# Patient Record
Sex: FEMALE | Race: BLACK OR AFRICAN AMERICAN | Employment: OTHER | URBAN - METROPOLITAN AREA
[De-identification: names, ages, dates, MRNs, and addresses within clinical notes are randomized per-mention and may not be internally consistent; named-entity substitution may affect disease eponyms.]

---

## 2017-03-01 ENCOUNTER — OFFICE VISIT (OUTPATIENT)
Dept: PRIMARY CARE CLINIC | Age: 72
End: 2017-03-01

## 2017-03-01 VITALS
OXYGEN SATURATION: 99 % | TEMPERATURE: 97.8 F | WEIGHT: 167 LBS | DIASTOLIC BLOOD PRESSURE: 100 MMHG | HEIGHT: 60 IN | HEART RATE: 91 BPM | BODY MASS INDEX: 32.79 KG/M2 | SYSTOLIC BLOOD PRESSURE: 150 MMHG

## 2017-03-01 DIAGNOSIS — M81.0 OSTEOPOROSIS: ICD-10-CM

## 2017-03-01 DIAGNOSIS — E55.9 VITAMIN D DEFICIENCY: ICD-10-CM

## 2017-03-01 DIAGNOSIS — R73.9 HYPERGLYCEMIA: ICD-10-CM

## 2017-03-01 DIAGNOSIS — Z12.31 ENCOUNTER FOR SCREENING MAMMOGRAM FOR HIGH-RISK PATIENT: ICD-10-CM

## 2017-03-01 DIAGNOSIS — Z12.11 SCREEN FOR COLON CANCER: ICD-10-CM

## 2017-03-01 DIAGNOSIS — Z23 NEED FOR PNEUMOCOCCAL VACCINATION: ICD-10-CM

## 2017-03-01 DIAGNOSIS — H53.9 VISION ABNORMALITIES: ICD-10-CM

## 2017-03-01 LAB
A/G RATIO: 1.4 (ref 1.1–2.2)
ALBUMIN SERPL-MCNC: 4.2 G/DL (ref 3.4–5)
ALP BLD-CCNC: 129 U/L (ref 40–129)
ALT SERPL-CCNC: 16 U/L (ref 10–40)
ANION GAP SERPL CALCULATED.3IONS-SCNC: 17 MMOL/L (ref 3–16)
AST SERPL-CCNC: 19 U/L (ref 15–37)
BACTERIA: ABNORMAL /HPF
BASOPHILS ABSOLUTE: 0 K/UL (ref 0–0.2)
BASOPHILS RELATIVE PERCENT: 0.5 %
BILIRUB SERPL-MCNC: 0.3 MG/DL (ref 0–1)
BILIRUBIN URINE: NEGATIVE
BLOOD, URINE: ABNORMAL
BUN BLDV-MCNC: 13 MG/DL (ref 7–20)
CALCIUM SERPL-MCNC: 9.6 MG/DL (ref 8.3–10.6)
CHLORIDE BLD-SCNC: 97 MMOL/L (ref 99–110)
CHOLESTEROL, TOTAL: 166 MG/DL (ref 0–199)
CLARITY: CLEAR
CO2: 26 MMOL/L (ref 21–32)
COLOR: YELLOW
COMMENT UA: ABNORMAL
CREAT SERPL-MCNC: 0.8 MG/DL (ref 0.6–1.2)
EOSINOPHILS ABSOLUTE: 0.1 K/UL (ref 0–0.6)
EOSINOPHILS RELATIVE PERCENT: 1.2 %
EPITHELIAL CELLS, UA: 2 /HPF (ref 0–5)
GFR AFRICAN AMERICAN: >60
GFR NON-AFRICAN AMERICAN: >60
GLOBULIN: 3 G/DL
GLUCOSE BLD-MCNC: 85 MG/DL (ref 70–99)
GLUCOSE URINE: NEGATIVE MG/DL
HCT VFR BLD CALC: 39.1 % (ref 36–48)
HDLC SERPL-MCNC: 45 MG/DL (ref 40–60)
HEMOGLOBIN: 12.6 G/DL (ref 12–16)
HYALINE CASTS: 0 /HPF (ref 0–8)
KETONES, URINE: NEGATIVE MG/DL
LDL CHOLESTEROL CALCULATED: 102 MG/DL
LEUKOCYTE ESTERASE, URINE: ABNORMAL
LYMPHOCYTES ABSOLUTE: 3.1 K/UL (ref 1–5.1)
LYMPHOCYTES RELATIVE PERCENT: 35.5 %
MCH RBC QN AUTO: 27.6 PG (ref 26–34)
MCHC RBC AUTO-ENTMCNC: 32.1 G/DL (ref 31–36)
MCV RBC AUTO: 85.7 FL (ref 80–100)
MICROSCOPIC EXAMINATION: YES
MONOCYTES ABSOLUTE: 0.5 K/UL (ref 0–1.3)
MONOCYTES RELATIVE PERCENT: 5.5 %
NEUTROPHILS ABSOLUTE: 5 K/UL (ref 1.7–7.7)
NEUTROPHILS RELATIVE PERCENT: 57.3 %
NITRITE, URINE: NEGATIVE
PDW BLD-RTO: 13.3 % (ref 12.4–15.4)
PH UA: 7
PLATELET # BLD: 202 K/UL (ref 135–450)
PMV BLD AUTO: 9.3 FL (ref 5–10.5)
POTASSIUM SERPL-SCNC: 4.1 MMOL/L (ref 3.5–5.1)
PROTEIN UA: NEGATIVE MG/DL
RBC # BLD: 4.56 M/UL (ref 4–5.2)
RBC UA: ABNORMAL /HPF (ref 0–2)
SODIUM BLD-SCNC: 140 MMOL/L (ref 136–145)
SPECIFIC GRAVITY UA: 1.02
TOTAL PROTEIN: 7.2 G/DL (ref 6.4–8.2)
TRIGL SERPL-MCNC: 97 MG/DL (ref 0–150)
TSH SERPL DL<=0.05 MIU/L-ACNC: 1.7 UIU/ML (ref 0.27–4.2)
URINE TYPE: ABNORMAL
UROBILINOGEN, URINE: 0.2 E.U./DL
VITAMIN D 25-HYDROXY: 20.1 NG/ML
VLDLC SERPL CALC-MCNC: 19 MG/DL
WBC # BLD: 8.7 K/UL (ref 4–11)
WBC UA: 9 /HPF (ref 0–5)

## 2017-03-01 PROCEDURE — 90732 PPSV23 VACC 2 YRS+ SUBQ/IM: CPT | Performed by: INTERNAL MEDICINE

## 2017-03-01 PROCEDURE — 99204 OFFICE O/P NEW MOD 45 MIN: CPT | Performed by: INTERNAL MEDICINE

## 2017-03-01 PROCEDURE — G0009 ADMIN PNEUMOCOCCAL VACCINE: HCPCS | Performed by: INTERNAL MEDICINE

## 2017-03-01 ASSESSMENT — ENCOUNTER SYMPTOMS
WHEEZING: 0
CONSTIPATION: 0
COUGH: 0
GASTROINTESTINAL NEGATIVE: 1
DIARRHEA: 0
ABDOMINAL PAIN: 0
BLOOD IN STOOL: 0
CHEST TIGHTNESS: 0
ABDOMINAL DISTENTION: 0
SHORTNESS OF BREATH: 0

## 2017-03-01 ASSESSMENT — PATIENT HEALTH QUESTIONNAIRE - PHQ9
SUM OF ALL RESPONSES TO PHQ9 QUESTIONS 1 & 2: 0
2. FEELING DOWN, DEPRESSED OR HOPELESS: 0
SUM OF ALL RESPONSES TO PHQ QUESTIONS 1-9: 0
1. LITTLE INTEREST OR PLEASURE IN DOING THINGS: 0

## 2017-03-02 LAB
ESTIMATED AVERAGE GLUCOSE: 116.9 MG/DL
HBA1C MFR BLD: 5.7 %

## 2017-03-02 RX ORDER — ERGOCALCIFEROL (VITAMIN D2) 1250 MCG
50000 CAPSULE ORAL WEEKLY
Qty: 4 CAPSULE | Refills: 3 | Status: SHIPPED | OUTPATIENT
Start: 2017-03-02 | End: 2018-04-25 | Stop reason: ALTCHOICE

## 2017-03-06 ENCOUNTER — TELEPHONE (OUTPATIENT)
Dept: PRIMARY CARE CLINIC | Age: 72
End: 2017-03-06

## 2018-04-27 ENCOUNTER — TELEPHONE (OUTPATIENT)
Dept: PRIMARY CARE CLINIC | Age: 73
End: 2018-04-27

## 2020-01-16 ENCOUNTER — HOSPITAL ENCOUNTER (EMERGENCY)
Age: 75
Discharge: HOME | End: 2020-01-16
Payer: MEDICARE

## 2020-01-16 VITALS
HEART RATE: 87 BPM | SYSTOLIC BLOOD PRESSURE: 156 MMHG | RESPIRATION RATE: 16 BRPM | TEMPERATURE: 98 F | BODY MASS INDEX: 30.02 KG/M2 | WEIGHT: 163.14 LBS | OXYGEN SATURATION: 91 % | HEIGHT: 62 IN | DIASTOLIC BLOOD PRESSURE: 81 MMHG

## 2020-01-16 PROCEDURE — 99282 EMERGENCY DEPT VISIT SF MDM: CPT

## 2020-01-16 ASSESSMENT — PAIN DESCRIPTION - FREQUENCY: FREQUENCY: CONTINUOUS

## 2020-01-16 ASSESSMENT — PAIN DESCRIPTION - ONSET: ONSET: GRADUAL

## 2020-01-16 ASSESSMENT — PAIN DESCRIPTION - PROGRESSION: CLINICAL_PROGRESSION: NOT CHANGED

## 2020-01-16 ASSESSMENT — PAIN DESCRIPTION - DESCRIPTORS: DESCRIPTORS: ACHING

## 2020-01-16 ASSESSMENT — PAIN DESCRIPTION - PAIN TYPE: TYPE: ACUTE PAIN

## 2020-01-16 ASSESSMENT — PAIN DESCRIPTION - ORIENTATION: ORIENTATION: LEFT

## 2020-01-16 ASSESSMENT — PAIN DESCRIPTION - LOCATION: LOCATION: ARM;BACK;LEG

## 2020-01-16 ASSESSMENT — PAIN SCALES - GENERAL: PAINLEVEL_OUTOF10: 5

## 2020-01-16 ASSESSMENT — PAIN - FUNCTIONAL ASSESSMENT: PAIN_FUNCTIONAL_ASSESSMENT: ACTIVITIES ARE NOT PREVENTED

## 2020-01-16 NOTE — ED NOTES
AVS reviewed with pt who verbalized understanding of f/u care and d/c instructions.  Pt stable for d.c      Leonela Rubin RN  01/16/20 5936
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
## 2020-01-18 ASSESSMENT — ENCOUNTER SYMPTOMS
BACK PAIN: 1
RESPIRATORY NEGATIVE: 1

## 2020-03-11 ENCOUNTER — APPOINTMENT (OUTPATIENT)
Dept: GENERAL RADIOLOGY | Age: 75
End: 2020-03-11
Payer: MEDICARE

## 2020-03-11 ENCOUNTER — APPOINTMENT (OUTPATIENT)
Dept: CT IMAGING | Age: 75
End: 2020-03-11
Payer: MEDICARE

## 2020-03-11 ENCOUNTER — HOSPITAL ENCOUNTER (EMERGENCY)
Age: 75
Discharge: HOME | End: 2020-03-11
Attending: EMERGENCY MEDICINE
Payer: MEDICARE

## 2020-03-11 VITALS
OXYGEN SATURATION: 99 % | HEART RATE: 63 BPM | BODY MASS INDEX: 30.55 KG/M2 | SYSTOLIC BLOOD PRESSURE: 145 MMHG | TEMPERATURE: 98.5 F | HEIGHT: 61 IN | RESPIRATION RATE: 14 BRPM | WEIGHT: 161.82 LBS | DIASTOLIC BLOOD PRESSURE: 92 MMHG

## 2020-03-11 LAB
A/G RATIO: 1 (ref 1.1–2.2)
ALBUMIN SERPL-MCNC: 3.7 G/DL (ref 3.4–5)
ALP BLD-CCNC: 145 U/L (ref 40–129)
ALT SERPL-CCNC: 273 U/L (ref 10–40)
ANION GAP SERPL CALCULATED.3IONS-SCNC: 11 MMOL/L (ref 3–16)
AST SERPL-CCNC: 117 U/L (ref 15–37)
BACTERIA: ABNORMAL /HPF
BASOPHILS ABSOLUTE: 0.1 K/UL (ref 0–0.2)
BASOPHILS RELATIVE PERCENT: 1 %
BILIRUB SERPL-MCNC: 0.7 MG/DL (ref 0–1)
BILIRUBIN URINE: NEGATIVE
BLOOD, URINE: NEGATIVE
BUN BLDV-MCNC: 11 MG/DL (ref 7–20)
CALCIUM SERPL-MCNC: 9.4 MG/DL (ref 8.3–10.6)
CHLORIDE BLD-SCNC: 100 MMOL/L (ref 99–110)
CLARITY: CLEAR
CO2: 26 MMOL/L (ref 21–32)
COLOR: YELLOW
CREAT SERPL-MCNC: 0.8 MG/DL (ref 0.6–1.2)
EKG ATRIAL RATE: 76 BPM
EKG DIAGNOSIS: NORMAL
EKG P AXIS: 40 DEGREES
EKG P-R INTERVAL: 154 MS
EKG Q-T INTERVAL: 384 MS
EKG QRS DURATION: 126 MS
EKG QTC CALCULATION (BAZETT): 432 MS
EKG R AXIS: 0 DEGREES
EKG T AXIS: 11 DEGREES
EKG VENTRICULAR RATE: 76 BPM
EOSINOPHILS ABSOLUTE: 0.1 K/UL (ref 0–0.6)
EOSINOPHILS RELATIVE PERCENT: 1.3 %
EPITHELIAL CELLS, UA: 4 /HPF (ref 0–5)
GFR AFRICAN AMERICAN: >60
GFR NON-AFRICAN AMERICAN: >60
GLOBULIN: 3.8 G/DL
GLUCOSE BLD-MCNC: 111 MG/DL (ref 70–99)
GLUCOSE URINE: NEGATIVE MG/DL
HAV IGM SER IA-ACNC: NORMAL
HCT VFR BLD CALC: 39.2 % (ref 36–48)
HEMOGLOBIN: 12.7 G/DL (ref 12–16)
HEPATITIS B CORE IGM ANTIBODY: NORMAL
HEPATITIS B SURFACE ANTIGEN INTERPRETATION: NORMAL
HEPATITIS C ANTIBODY INTERPRETATION: NORMAL
HYALINE CASTS: 2 /LPF (ref 0–8)
KETONES, URINE: NEGATIVE MG/DL
LEUKOCYTE ESTERASE, URINE: ABNORMAL
LIPASE: 25 U/L (ref 13–60)
LYMPHOCYTES ABSOLUTE: 2 K/UL (ref 1–5.1)
LYMPHOCYTES RELATIVE PERCENT: 28.1 %
MCH RBC QN AUTO: 27.9 PG (ref 26–34)
MCHC RBC AUTO-ENTMCNC: 32.3 G/DL (ref 31–36)
MCV RBC AUTO: 86.4 FL (ref 80–100)
MICROSCOPIC EXAMINATION: YES
MONOCYTES ABSOLUTE: 0.4 K/UL (ref 0–1.3)
MONOCYTES RELATIVE PERCENT: 6 %
NEUTROPHILS ABSOLUTE: 4.5 K/UL (ref 1.7–7.7)
NEUTROPHILS RELATIVE PERCENT: 63.6 %
NITRITE, URINE: NEGATIVE
PDW BLD-RTO: 13.5 % (ref 12.4–15.4)
PH UA: 6.5 (ref 5–8)
PLATELET # BLD: 188 K/UL (ref 135–450)
PMV BLD AUTO: 7.9 FL (ref 5–10.5)
POTASSIUM REFLEX MAGNESIUM: 4.2 MMOL/L (ref 3.5–5.1)
PROTEIN UA: NEGATIVE MG/DL
RBC # BLD: 4.53 M/UL (ref 4–5.2)
RBC UA: 2 /HPF (ref 0–4)
SODIUM BLD-SCNC: 137 MMOL/L (ref 136–145)
SPECIFIC GRAVITY UA: 1.02 (ref 1–1.03)
TOTAL PROTEIN: 7.5 G/DL (ref 6.4–8.2)
URINE REFLEX TO CULTURE: YES
URINE TYPE: ABNORMAL
UROBILINOGEN, URINE: 1 E.U./DL
WBC # BLD: 7.1 K/UL (ref 4–11)
WBC UA: 10 /HPF (ref 0–5)

## 2020-03-11 PROCEDURE — 6360000004 HC RX CONTRAST MEDICATION: Performed by: PHYSICIAN ASSISTANT

## 2020-03-11 PROCEDURE — 2580000003 HC RX 258: Performed by: PHYSICIAN ASSISTANT

## 2020-03-11 PROCEDURE — 93010 ELECTROCARDIOGRAM REPORT: CPT | Performed by: INTERNAL MEDICINE

## 2020-03-11 PROCEDURE — 83690 ASSAY OF LIPASE: CPT

## 2020-03-11 PROCEDURE — 71045 X-RAY EXAM CHEST 1 VIEW: CPT

## 2020-03-11 PROCEDURE — 80053 COMPREHEN METABOLIC PANEL: CPT

## 2020-03-11 PROCEDURE — 85025 COMPLETE CBC W/AUTO DIFF WBC: CPT

## 2020-03-11 PROCEDURE — 93005 ELECTROCARDIOGRAM TRACING: CPT | Performed by: PHYSICIAN ASSISTANT

## 2020-03-11 PROCEDURE — 81001 URINALYSIS AUTO W/SCOPE: CPT

## 2020-03-11 PROCEDURE — 99284 EMERGENCY DEPT VISIT MOD MDM: CPT

## 2020-03-11 PROCEDURE — 87086 URINE CULTURE/COLONY COUNT: CPT

## 2020-03-11 PROCEDURE — 74177 CT ABD & PELVIS W/CONTRAST: CPT

## 2020-03-11 PROCEDURE — 80074 ACUTE HEPATITIS PANEL: CPT

## 2020-03-11 RX ORDER — 0.9 % SODIUM CHLORIDE 0.9 %
1000 INTRAVENOUS SOLUTION INTRAVENOUS ONCE
Status: COMPLETED | OUTPATIENT
Start: 2020-03-11 | End: 2020-03-11

## 2020-03-11 RX ADMIN — IOPAMIDOL 75 ML: 755 INJECTION, SOLUTION INTRAVENOUS at 09:32

## 2020-03-11 RX ADMIN — SODIUM CHLORIDE 1000 ML: 9 INJECTION, SOLUTION INTRAVENOUS at 09:07

## 2020-03-11 NOTE — ED PROVIDER NOTES
[facility]  eMERGENCY dEPARTMENT eNCOUnter   Physician Attestation    Pt Name: Rivka Li  MRN: [id number]  [place] 1945  Date of evaluation: 3/11/20        Physician Note:    I havepersonally performed and/or participated in the history, exam and medical decision making and agree with all pertinent clinical information. I have also reviewed and agree with the past medical, family and social historyunless otherwise noted. I have personally performed a face to face diagnostic evaluation onthis patient. I have reviewed the mid-levels findings and agree. History: 82-year-old female who presents with some dizziness. She was outside a night and ate some chili. That night she started having vomiting than diarrhea. For the most part the symptoms are is resolved. She was still feeling a little dizzy so she comes in to be evaluated. At the moment she is asymptomatic and she is able to eat and drink. CT scan was done that showed a possible gallbladder carcinoma with some lymph node involvement. Physical Exam  Vitals signs and nursing note reviewed. Constitutional:       Appearance: She is well-developed. She is not diaphoretic. HENT:      Head: Normocephalic and atraumatic. Right Ear: External ear normal.      Left Ear: External ear normal.   Eyes:      General: No scleral icterus. Right eye: No discharge. Left eye: No discharge. Conjunctiva/sclera: Conjunctivae normal.   Neck:      Musculoskeletal: Normal range of motion. Trachea: No tracheal deviation. Pulmonary:      Effort: Pulmonary effort is normal. No respiratory distress. Breath sounds: No stridor. Abdominal:      Tenderness: There is no abdominal tenderness. There is no guarding or rebound. Musculoskeletal: Normal range of motion. Skin:     General: Skin is warm and dry. Neurological:      Mental Status: She is alert and oriented to person, place, and time. Coordination: Coordination normal.   Psychiatric:         Behavior: Behavior normal.       Ct Abdomen Pelvis W Iv Contrast Additional Contrast? None    Result Date: 3/11/2020  EXAMINATION: CT OF THE ABDOMEN AND PELVIS WITH CONTRAST 3/11/2020 9:32 am TECHNIQUE: CT of the abdomen and pelvis was performed with the administration of intravenous contrast. Multiplanar reformatted images are provided for review. Dose modulation, iterative reconstruction, and/or weight based adjustment of the mA/kV was utilized to reduce the radiation dose to as low as reasonably achievable. COMPARISON: None. HISTORY: ORDERING SYSTEM PROVIDED HISTORY: N/V/D, elevated LFTs TECHNOLOGIST PROVIDED HISTORY: Reason for exam:->N/V/D, elevated LFTs Additional Contrast?->None Reason for Exam: vomitting, elevated lft's Acuity: Acute Type of Exam: Initial Relevant Medical/Surgical History: hysterectomy FINDINGS: Lower Chest: There is a large sliding-type hiatal hernia. There is no consolidation or effusion. Organs: The liver, pancreas, spleen, kidneys and adrenals are unremarkable aside from a tiny nonobstructing 1 mm left intrarenal calculus and benign 17 mm right adrenal adenoma. There is lobular enhancing soft tissue mass within the gallbladder exhibiting dimensions of 3.5 x 2.4 cm. There are a few small calcifications within the lower dependent portion of the gallbladder. GI/Bowel: Moderate diverticular changes are present throughout the left hemicolon. There is no bowel dilatation, wall thickening or obstruction. Pelvis: The uterus is surgically absent. The bladder is unremarkable. Peritoneum/Retroperitoneum: There is no free air, free fluid or intraperitoneal inflammatory change. There are a few mildly enlarged lymph nodes within the saul hepatis ranging in size up to 1.2 cm in diameter. Bones/Soft Tissues: There is no fracture or aggressive osseous lesion.      1. 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 2. Cholelithiasis. 3. Diverticulosis. 4. Nonobstructing left nephrolithiasis. Xr Chest Portable    Result Date: 3/11/2020  EXAMINATION: ONE XRAY VIEW OF THE CHEST 3/11/2020 9:12 am COMPARISON: None. HISTORY: ORDERING SYSTEM PROVIDED HISTORY: N/V/D TECHNOLOGIST PROVIDED HISTORY: Reason for exam:->N/V/D Reason for Exam: N/V/D Acuity: Acute Type of Exam: Initial FINDINGS: Heart size is normal.  Hiatal hernia is seen. No focal lung consolidation noted. No pulmonary edema. No acute disease       1. Gastroenteritis    2. Abnormal computed tomography angiography (CTA) of abdomen    3. Elevated blood pressure reading          DISPOSITION/PLAN  PATIENT REFERRED TO:  Elbert Navarrete MD  [address] [place] [address]  [phone]    On 3/17/2020  AT [facility]  [phone]  Call   to get a family doctor    Elbert Navarrete MD  [address]  [phone]    On 3/17/2020  appointment at 8 AM.     DISCHARGE MEDICATIONS:  There are no discharge medications for this patient.         MD Rudy Estes MD  03/11/20 9456
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
## 2020-03-12 LAB — URINE CULTURE, ROUTINE: NORMAL

## 2020-03-20 ENCOUNTER — OFFICE VISIT (OUTPATIENT)
Dept: SURGERY | Age: 75
End: 2020-03-20
Payer: MEDICARE

## 2020-03-20 VITALS
SYSTOLIC BLOOD PRESSURE: 146 MMHG | HEIGHT: 62 IN | BODY MASS INDEX: 29.6 KG/M2 | HEART RATE: 87 BPM | OXYGEN SATURATION: 96 % | DIASTOLIC BLOOD PRESSURE: 93 MMHG | TEMPERATURE: 98.1 F

## 2020-03-20 PROCEDURE — 99205 OFFICE O/P NEW HI 60 MIN: CPT | Performed by: SURGERY

## 2020-03-25 ENCOUNTER — TELEPHONE (OUTPATIENT)
Dept: SURGERY | Age: 75
End: 2020-03-25

## 2020-04-08 ENCOUNTER — TELEPHONE (OUTPATIENT)
Dept: SURGERY | Age: 75
End: 2020-04-08

## 2020-04-23 ENCOUNTER — TELEPHONE (OUTPATIENT)
Dept: SURGERY | Age: 75
End: 2020-04-23

## 2020-04-23 NOTE — PROGRESS NOTES
discoloration noted on facial skin         [] Abnormal-            Psychiatric:       [x] Normal mood and Affect. Normal behavior, judgement and thought content. [] Abnormal-     Other pertinent observable physical exam findings- none      ASSESSMENT/PLAN:     Diagnosis Orders   1. Gallbladder mass         Adriana Oates is a 69 y.o. female being evaluated by a Virtual Visit (video visit) encounter to address concerns as mentioned above. A caregiver was present when appropriate. Due to this being a TeleHealth encounter (During [facility]-17 public health emergency), evaluation of the some organ systems was limited. This Virtual Visit was conducted with patient's (and/or legal guardian's) consent. The patient (and/or legal guardian) has also been advised to contact this office for worsening conditions or problems, and seek emergency medical treatment and/or call 911 if deemed necessary. I discussed with the [address] [place] [address],[address] about the diagnosis and management options. At patient's last visit I explained that based on the available information she appears to have a Gallbladder mass most consistent with gallbladder carcinoma with possible localized lymph node involvement. Today I reviewed with  her about laparoscopic / robotic and open Cholecystectomy with liver resection and lymph nodes removal. Printed information was given at last visit. All the complications including bleeding, infection, clot's, leak, pneumonia, heart attack and stroke were explained. Risks, benefits and alternatives of surgery explained to the patient. Further management will depend on final pathology. All the questions of the patient are answered to her apparent satisfaction. Patient verbalized understanding of the management plan. Patient understands the risk of COVID-19 infection. Flex Negrete MD  Surgery Attending      An electronic signature was used to authenticate this note.

## 2020-04-24 ENCOUNTER — TELEPHONE (OUTPATIENT)
Dept: SURGERY | Age: 75
End: 2020-04-24

## 2020-04-24 ENCOUNTER — TELEMEDICINE (OUTPATIENT)
Dept: SURGERY | Age: 75
End: 2020-04-24
Payer: MEDICARE

## 2020-04-24 PROCEDURE — 99214 OFFICE O/P EST MOD 30 MIN: CPT | Performed by: SURGERY

## 2020-04-29 ENCOUNTER — OFFICE VISIT (OUTPATIENT)
Dept: PRIMARY CARE CLINIC | Age: 75
End: 2020-04-29
Payer: MEDICARE

## 2020-04-29 ENCOUNTER — TELEPHONE (OUTPATIENT)
Dept: SURGERY | Age: 75
End: 2020-04-29

## 2020-04-29 ENCOUNTER — OFFICE VISIT (OUTPATIENT)
Dept: CARDIOLOGY CLINIC | Age: 75
End: 2020-04-29
Payer: MEDICARE

## 2020-04-29 VITALS — WEIGHT: 16 LBS | BODY MASS INDEX: 2.93 KG/M2 | TEMPERATURE: 98.2 F | HEART RATE: 80 BPM

## 2020-04-29 VITALS — TEMPERATURE: 99 F | HEART RATE: 76 BPM | OXYGEN SATURATION: 97 %

## 2020-04-29 PROCEDURE — 99213 OFFICE O/P EST LOW 20 MIN: CPT | Performed by: NURSE PRACTITIONER

## 2020-04-29 PROCEDURE — 99204 OFFICE O/P NEW MOD 45 MIN: CPT | Performed by: INTERNAL MEDICINE

## 2020-04-29 PROCEDURE — 93000 ELECTROCARDIOGRAM COMPLETE: CPT | Performed by: INTERNAL MEDICINE

## 2020-04-29 ASSESSMENT — ENCOUNTER SYMPTOMS
SHORTNESS OF BREATH: 0
CHOKING: 0
COUGH: 0
CHEST TIGHTNESS: 0
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
## 2020-04-30 ENCOUNTER — TELEPHONE (OUTPATIENT)
Dept: CARDIOLOGY CLINIC | Age: 75
End: 2020-04-30

## 2020-04-30 ENCOUNTER — HOSPITAL ENCOUNTER (OUTPATIENT)
Dept: NON INVASIVE DIAGNOSTICS | Age: 75
Discharge: HOME | End: 2020-04-30
Payer: MEDICARE

## 2020-04-30 ENCOUNTER — APPOINTMENT (OUTPATIENT)
Dept: NON INVASIVE DIAGNOSTICS | Age: 75
End: 2020-04-30
Payer: MEDICARE

## 2020-04-30 ENCOUNTER — HOSPITAL ENCOUNTER (OUTPATIENT)
Dept: NON INVASIVE DIAGNOSTICS | Age: 75
End: 2020-04-30
Payer: MEDICARE

## 2020-04-30 LAB
LV EF: 95 %
LVEF MODALITY: NORMAL

## 2020-04-30 PROCEDURE — 78452 HT MUSCLE IMAGE SPECT MULT: CPT

## 2020-04-30 PROCEDURE — A9502 TC99M TETROFOSMIN: HCPCS | Performed by: INTERNAL MEDICINE

## 2020-04-30 PROCEDURE — 93017 CV STRESS TEST TRACING ONLY: CPT

## 2020-04-30 PROCEDURE — 6360000002 HC RX W HCPCS: Performed by: INTERNAL MEDICINE

## 2020-04-30 PROCEDURE — 2580000003 HC RX 258: Performed by: INTERNAL MEDICINE

## 2020-04-30 PROCEDURE — 3430000000 HC RX DIAGNOSTIC RADIOPHARMACEUTICAL: Performed by: INTERNAL MEDICINE

## 2020-04-30 RX ORDER — SODIUM CHLORIDE 0.9 % (FLUSH) 0.9 %
10 SYRINGE (ML) INJECTION PRN
Status: COMPLETED | OUTPATIENT
Start: 2020-04-30 | End: 2020-04-30

## 2020-04-30 RX ADMIN — Medication 10 ML: at 14:18

## 2020-04-30 RX ADMIN — REGADENOSON 0.4 MG: 0.08 INJECTION, SOLUTION INTRAVENOUS at 14:19

## 2020-04-30 RX ADMIN — Medication 10 ML: at 13:37

## 2020-04-30 RX ADMIN — TETROFOSMIN 9.5 MILLICURIE: 1.38 INJECTION, POWDER, LYOPHILIZED, FOR SOLUTION INTRAVENOUS at 13:37

## 2020-04-30 RX ADMIN — TETROFOSMIN 36 MILLICURIE: 1.38 INJECTION, POWDER, LYOPHILIZED, FOR SOLUTION INTRAVENOUS at 14:18

## 2020-04-30 NOTE — TELEPHONE ENCOUNTER
Pt having surgery Monday she would like to know if  can put something in the computer to over ride her so that she can have her stress test. A stat order was placed on 04/29/2020 to be completed 04/30/2020 however she had to be tested for COVID-19 for an upcoming surgery which prevented her from having her stress test  Today. She is afraid that she will not be able to have surgery Monday. She wants to know what can she do.

## 2020-05-01 ENCOUNTER — OFFICE VISIT (OUTPATIENT)
Dept: PRIMARY CARE CLINIC | Age: 75
End: 2020-05-01

## 2020-05-01 ENCOUNTER — ANESTHESIA EVENT (OUTPATIENT)
Dept: OPERATING ROOM | Age: 75
End: 2020-05-01
Payer: MEDICARE

## 2020-05-01 PROCEDURE — 99999 PR OFFICE/OUTPT VISIT,PROCEDURE ONLY: CPT | Performed by: NURSE PRACTITIONER

## 2020-05-02 LAB
SARS-COV-2: NOT DETECTED
SOURCE: NORMAL

## 2020-05-04 ENCOUNTER — APPOINTMENT (OUTPATIENT)
Dept: GENERAL RADIOLOGY | Age: 75
End: 2020-05-04
Attending: SURGERY
Payer: MEDICARE

## 2020-05-04 ENCOUNTER — ANESTHESIA (OUTPATIENT)
Dept: OPERATING ROOM | Age: 75
End: 2020-05-04
Payer: MEDICARE

## 2020-05-04 ENCOUNTER — HOSPITAL ENCOUNTER (OUTPATIENT)
Age: 75
Setting detail: OUTPATIENT SURGERY
Discharge: HOME | End: 2020-05-04
Attending: SURGERY | Admitting: SURGERY
Payer: MEDICARE

## 2020-05-04 VITALS
BODY MASS INDEX: 30.55 KG/M2 | HEART RATE: 85 BPM | HEIGHT: 62 IN | WEIGHT: 166 LBS | TEMPERATURE: 97.4 F | SYSTOLIC BLOOD PRESSURE: 133 MMHG | DIASTOLIC BLOOD PRESSURE: 86 MMHG | OXYGEN SATURATION: 97 % | RESPIRATION RATE: 16 BRPM

## 2020-05-04 VITALS — TEMPERATURE: 97.3 F | SYSTOLIC BLOOD PRESSURE: 142 MMHG | DIASTOLIC BLOOD PRESSURE: 70 MMHG | OXYGEN SATURATION: 94 %

## 2020-05-04 LAB
A/G RATIO: 1.3 (ref 1.1–2.2)
ABO/RH: NORMAL
ALBUMIN SERPL-MCNC: 4.3 G/DL (ref 3.4–5)
ALP BLD-CCNC: 110 U/L (ref 40–129)
ALT SERPL-CCNC: 20 U/L (ref 10–40)
ANION GAP SERPL CALCULATED.3IONS-SCNC: 12 MMOL/L (ref 3–16)
ANTIBODY SCREEN: NORMAL
APTT: 28.1 SEC (ref 24.2–36.2)
AST SERPL-CCNC: 33 U/L (ref 15–37)
BILIRUB SERPL-MCNC: 0.6 MG/DL (ref 0–1)
BUN BLDV-MCNC: 11 MG/DL (ref 7–20)
CALCIUM SERPL-MCNC: 9.6 MG/DL (ref 8.3–10.6)
CHLORIDE BLD-SCNC: 101 MMOL/L (ref 99–110)
CO2: 25 MMOL/L (ref 21–32)
CREAT SERPL-MCNC: 0.8 MG/DL (ref 0.6–1.2)
GFR AFRICAN AMERICAN: >60
GFR NON-AFRICAN AMERICAN: >60
GLOBULIN: 3.3 G/DL
GLUCOSE BLD-MCNC: 105 MG/DL (ref 70–99)
HCT VFR BLD CALC: 40.1 % (ref 36–48)
HEMOGLOBIN: 12.8 G/DL (ref 12–16)
INR BLD: 1.09 (ref 0.86–1.14)
MCH RBC QN AUTO: 28 PG (ref 26–34)
MCHC RBC AUTO-ENTMCNC: 32 G/DL (ref 31–36)
MCV RBC AUTO: 87.6 FL (ref 80–100)
PDW BLD-RTO: 13.2 % (ref 12.4–15.4)
PLATELET # BLD: 202 K/UL (ref 135–450)
PMV BLD AUTO: 8.3 FL (ref 5–10.5)
POTASSIUM SERPL-SCNC: 4.1 MMOL/L (ref 3.5–5.1)
PROTHROMBIN TIME: 12.6 SEC (ref 10–13.2)
RBC # BLD: 4.57 M/UL (ref 4–5.2)
SARS-COV-2: NOT DETECTED
SODIUM BLD-SCNC: 138 MMOL/L (ref 136–145)
SOURCE: NORMAL
TOTAL PROTEIN: 7.6 G/DL (ref 6.4–8.2)
WBC # BLD: 8.1 K/UL (ref 4–11)

## 2020-05-04 PROCEDURE — 2580000003 HC RX 258: Performed by: ANESTHESIOLOGY

## 2020-05-04 PROCEDURE — 3700000001 HC ADD 15 MINUTES (ANESTHESIA): Performed by: SURGERY

## 2020-05-04 PROCEDURE — 86901 BLOOD TYPING SEROLOGIC RH(D): CPT

## 2020-05-04 PROCEDURE — 71046 X-RAY EXAM CHEST 2 VIEWS: CPT

## 2020-05-04 PROCEDURE — 2720000010 HC SURG SUPPLY STERILE: Performed by: SURGERY

## 2020-05-04 PROCEDURE — 86850 RBC ANTIBODY SCREEN: CPT

## 2020-05-04 PROCEDURE — 88304 TISSUE EXAM BY PATHOLOGIST: CPT

## 2020-05-04 PROCEDURE — 2500000003 HC RX 250 WO HCPCS: Performed by: NURSE ANESTHETIST, CERTIFIED REGISTERED

## 2020-05-04 PROCEDURE — 7100000000 HC PACU RECOVERY - FIRST 15 MIN: Performed by: SURGERY

## 2020-05-04 PROCEDURE — 85730 THROMBOPLASTIN TIME PARTIAL: CPT

## 2020-05-04 PROCEDURE — 2580000003 HC RX 258: Performed by: SURGERY

## 2020-05-04 PROCEDURE — 3600000009 HC SURGERY ROBOT BASE: Performed by: SURGERY

## 2020-05-04 PROCEDURE — 49905 OMENTAL FLAP INTRA-ABDOM: CPT | Performed by: SURGERY

## 2020-05-04 PROCEDURE — 7100000001 HC PACU RECOVERY - ADDTL 15 MIN: Performed by: SURGERY

## 2020-05-04 PROCEDURE — 3700000000 HC ANESTHESIA ATTENDED CARE: Performed by: SURGERY

## 2020-05-04 PROCEDURE — 47562 LAPAROSCOPIC CHOLECYSTECTOMY: CPT | Performed by: SURGERY

## 2020-05-04 PROCEDURE — 2580000003 HC RX 258: Performed by: NURSE ANESTHETIST, CERTIFIED REGISTERED

## 2020-05-04 PROCEDURE — 6360000002 HC RX W HCPCS: Performed by: NURSE ANESTHETIST, CERTIFIED REGISTERED

## 2020-05-04 PROCEDURE — 76998 US GUIDE INTRAOP: CPT | Performed by: SURGERY

## 2020-05-04 PROCEDURE — 88331 PATH CONSLTJ SURG 1 BLK 1SPC: CPT

## 2020-05-04 PROCEDURE — 80053 COMPREHEN METABOLIC PANEL: CPT

## 2020-05-04 PROCEDURE — 2500000003 HC RX 250 WO HCPCS: Performed by: SURGERY

## 2020-05-04 PROCEDURE — 38570 LAPAROSCOPY LYMPH NODE BIOP: CPT | Performed by: SURGERY

## 2020-05-04 PROCEDURE — P9045 ALBUMIN (HUMAN), 5%, 250 ML: HCPCS | Performed by: NURSE ANESTHETIST, CERTIFIED REGISTERED

## 2020-05-04 PROCEDURE — 3600000019 HC SURGERY ROBOT ADDTL 15MIN: Performed by: SURGERY

## 2020-05-04 PROCEDURE — S2900 ROBOTIC SURGICAL SYSTEM: HCPCS | Performed by: SURGERY

## 2020-05-04 PROCEDURE — 7100000010 HC PHASE II RECOVERY - FIRST 15 MIN: Performed by: SURGERY

## 2020-05-04 PROCEDURE — 85610 PROTHROMBIN TIME: CPT

## 2020-05-04 PROCEDURE — 85027 COMPLETE CBC AUTOMATED: CPT

## 2020-05-04 PROCEDURE — 86900 BLOOD TYPING SEROLOGIC ABO: CPT

## 2020-05-04 PROCEDURE — 6360000002 HC RX W HCPCS: Performed by: SURGERY

## 2020-05-04 PROCEDURE — 2709999900 HC NON-CHARGEABLE SUPPLY: Performed by: SURGERY

## 2020-05-04 PROCEDURE — 7100000011 HC PHASE II RECOVERY - ADDTL 15 MIN: Performed by: SURGERY

## 2020-05-04 RX ORDER — SODIUM CHLORIDE 9 MG/ML
INJECTION, SOLUTION INTRAVENOUS CONTINUOUS
Status: DISCONTINUED | OUTPATIENT
Start: 2020-05-04 | End: 2020-05-04 | Stop reason: HOSPADM

## 2020-05-04 RX ORDER — ONDANSETRON 2 MG/ML
4 INJECTION INTRAMUSCULAR; INTRAVENOUS
Status: DISCONTINUED | OUTPATIENT
Start: 2020-05-04 | End: 2020-05-04 | Stop reason: HOSPADM

## 2020-05-04 RX ORDER — SODIUM CHLORIDE, SODIUM LACTATE, POTASSIUM CHLORIDE, CALCIUM CHLORIDE 600; 310; 30; 20 MG/100ML; MG/100ML; MG/100ML; MG/100ML
INJECTION, SOLUTION INTRAVENOUS CONTINUOUS PRN
Status: DISCONTINUED | OUTPATIENT
Start: 2020-05-04 | End: 2020-05-04 | Stop reason: SDUPTHER

## 2020-05-04 RX ORDER — PROMETHAZINE HYDROCHLORIDE 25 MG/ML
6.25 INJECTION, SOLUTION INTRAMUSCULAR; INTRAVENOUS
Status: DISCONTINUED | OUTPATIENT
Start: 2020-05-04 | End: 2020-05-04 | Stop reason: HOSPADM

## 2020-05-04 RX ORDER — FENTANYL CITRATE 50 UG/ML
INJECTION, SOLUTION INTRAMUSCULAR; INTRAVENOUS PRN
Status: DISCONTINUED | OUTPATIENT
Start: 2020-05-04 | End: 2020-05-04 | Stop reason: SDUPTHER

## 2020-05-04 RX ORDER — PROPOFOL 10 MG/ML
INJECTION, EMULSION INTRAVENOUS PRN
Status: DISCONTINUED | OUTPATIENT
Start: 2020-05-04 | End: 2020-05-04 | Stop reason: SDUPTHER

## 2020-05-04 RX ORDER — SUCCINYLCHOLINE CHLORIDE 20 MG/ML
INJECTION INTRAMUSCULAR; INTRAVENOUS PRN
Status: DISCONTINUED | OUTPATIENT
Start: 2020-05-04 | End: 2020-05-04 | Stop reason: SDUPTHER

## 2020-05-04 RX ORDER — GLYCOPYRROLATE 0.2 MG/ML
INJECTION INTRAMUSCULAR; INTRAVENOUS PRN
Status: DISCONTINUED | OUTPATIENT
Start: 2020-05-04 | End: 2020-05-04 | Stop reason: SDUPTHER

## 2020-05-04 RX ORDER — SODIUM CHLORIDE 0.9 % (FLUSH) 0.9 %
10 SYRINGE (ML) INJECTION EVERY 12 HOURS SCHEDULED
Status: DISCONTINUED | OUTPATIENT
Start: 2020-05-04 | End: 2020-05-04 | Stop reason: HOSPADM

## 2020-05-04 RX ORDER — FENTANYL CITRATE 50 UG/ML
25 INJECTION, SOLUTION INTRAMUSCULAR; INTRAVENOUS EVERY 5 MIN PRN
Status: DISCONTINUED | OUTPATIENT
Start: 2020-05-04 | End: 2020-05-04 | Stop reason: HOSPADM

## 2020-05-04 RX ORDER — MIDAZOLAM HYDROCHLORIDE 1 MG/ML
INJECTION INTRAMUSCULAR; INTRAVENOUS PRN
Status: DISCONTINUED | OUTPATIENT
Start: 2020-05-04 | End: 2020-05-04 | Stop reason: SDUPTHER

## 2020-05-04 RX ORDER — MAGNESIUM HYDROXIDE 1200 MG/15ML
LIQUID ORAL CONTINUOUS PRN
Status: COMPLETED | OUTPATIENT
Start: 2020-05-04 | End: 2020-05-04

## 2020-05-04 RX ORDER — ALBUMIN, HUMAN INJ 5% 5 %
SOLUTION INTRAVENOUS PRN
Status: DISCONTINUED | OUTPATIENT
Start: 2020-05-04 | End: 2020-05-04 | Stop reason: SDUPTHER

## 2020-05-04 RX ORDER — DEXAMETHASONE SODIUM PHOSPHATE 4 MG/ML
INJECTION, SOLUTION INTRA-ARTICULAR; INTRALESIONAL; INTRAMUSCULAR; INTRAVENOUS; SOFT TISSUE PRN
Status: DISCONTINUED | OUTPATIENT
Start: 2020-05-04 | End: 2020-05-04 | Stop reason: SDUPTHER

## 2020-05-04 RX ORDER — 0.9 % SODIUM CHLORIDE 0.9 %
500 INTRAVENOUS SOLUTION INTRAVENOUS
Status: DISCONTINUED | OUTPATIENT
Start: 2020-05-04 | End: 2020-05-04 | Stop reason: HOSPADM

## 2020-05-04 RX ORDER — ROCURONIUM BROMIDE 10 MG/ML
INJECTION, SOLUTION INTRAVENOUS PRN
Status: DISCONTINUED | OUTPATIENT
Start: 2020-05-04 | End: 2020-05-04 | Stop reason: SDUPTHER

## 2020-05-04 RX ORDER — SODIUM CHLORIDE, SODIUM LACTATE, POTASSIUM CHLORIDE, CALCIUM CHLORIDE 600; 310; 30; 20 MG/100ML; MG/100ML; MG/100ML; MG/100ML
INJECTION, SOLUTION INTRAVENOUS CONTINUOUS
Status: DISCONTINUED | OUTPATIENT
Start: 2020-05-04 | End: 2020-05-04 | Stop reason: HOSPADM

## 2020-05-04 RX ORDER — SODIUM CHLORIDE 0.9 % (FLUSH) 0.9 %
10 SYRINGE (ML) INJECTION PRN
Status: DISCONTINUED | OUTPATIENT
Start: 2020-05-04 | End: 2020-05-04 | Stop reason: HOSPADM

## 2020-05-04 RX ORDER — OXYCODONE HYDROCHLORIDE 5 MG/1
5 TABLET ORAL EVERY 6 HOURS PRN
Qty: 5 TABLET | Refills: 0 | Status: SHIPPED | OUTPATIENT
Start: 2020-05-04 | End: 2020-05-05

## 2020-05-04 RX ORDER — EPHEDRINE SULFATE 50 MG/ML
INJECTION INTRAVENOUS PRN
Status: DISCONTINUED | OUTPATIENT
Start: 2020-05-04 | End: 2020-05-04 | Stop reason: SDUPTHER

## 2020-05-04 RX ORDER — BUPIVACAINE HYDROCHLORIDE AND EPINEPHRINE 5; 5 MG/ML; UG/ML
INJECTION, SOLUTION EPIDURAL; INTRACAUDAL; PERINEURAL PRN
Status: DISCONTINUED | OUTPATIENT
Start: 2020-05-04 | End: 2020-05-04 | Stop reason: ALTCHOICE

## 2020-05-04 RX ORDER — ONDANSETRON 2 MG/ML
INJECTION INTRAMUSCULAR; INTRAVENOUS PRN
Status: DISCONTINUED | OUTPATIENT
Start: 2020-05-04 | End: 2020-05-04 | Stop reason: SDUPTHER

## 2020-05-04 RX ORDER — SODIUM CHLORIDE, SODIUM LACTATE, POTASSIUM CHLORIDE, AND CALCIUM CHLORIDE .6; .31; .03; .02 G/100ML; G/100ML; G/100ML; G/100ML
IRRIGANT IRRIGATION PRN
Status: DISCONTINUED | OUTPATIENT
Start: 2020-05-04 | End: 2020-05-04 | Stop reason: ALTCHOICE

## 2020-05-04 RX ORDER — HYDROMORPHONE HCL 110MG/55ML
PATIENT CONTROLLED ANALGESIA SYRINGE INTRAVENOUS PRN
Status: DISCONTINUED | OUTPATIENT
Start: 2020-05-04 | End: 2020-05-04 | Stop reason: SDUPTHER

## 2020-05-04 RX ADMIN — ROCURONIUM BROMIDE 25 MG: 10 INJECTION, SOLUTION INTRAVENOUS at 11:32

## 2020-05-04 RX ADMIN — HYDROMORPHONE HYDROCHLORIDE 0.25 MG: 2 INJECTION, SOLUTION INTRAMUSCULAR; INTRAVENOUS; SUBCUTANEOUS at 11:56

## 2020-05-04 RX ADMIN — DEXAMETHASONE SODIUM PHOSPHATE 4 MG: 4 INJECTION, SOLUTION INTRAMUSCULAR; INTRAVENOUS at 09:38

## 2020-05-04 RX ADMIN — SUGAMMADEX 200 MG: 100 INJECTION, SOLUTION INTRAVENOUS at 12:15

## 2020-05-04 RX ADMIN — PROPOFOL 30 MG: 10 INJECTION, EMULSION INTRAVENOUS at 09:35

## 2020-05-04 RX ADMIN — SUCCINYLCHOLINE CHLORIDE 60 MG: 20 INJECTION, SOLUTION INTRAMUSCULAR; INTRAVENOUS; PARENTERAL at 09:33

## 2020-05-04 RX ADMIN — SODIUM CHLORIDE, POTASSIUM CHLORIDE, SODIUM LACTATE AND CALCIUM CHLORIDE: 600; 310; 30; 20 INJECTION, SOLUTION INTRAVENOUS at 07:27

## 2020-05-04 RX ADMIN — FENTANYL CITRATE 25 MCG: 50 INJECTION INTRAMUSCULAR; INTRAVENOUS at 09:35

## 2020-05-04 RX ADMIN — GLYCOPYRROLATE 0.2 MG: 0.2 INJECTION INTRAMUSCULAR; INTRAVENOUS at 09:57

## 2020-05-04 RX ADMIN — PROPOFOL 70 MG: 10 INJECTION, EMULSION INTRAVENOUS at 09:32

## 2020-05-04 RX ADMIN — ALBUMIN (HUMAN) 250 ML: 12.5 SOLUTION INTRAVENOUS at 11:34

## 2020-05-04 RX ADMIN — ROCURONIUM BROMIDE 30 MG: 10 INJECTION, SOLUTION INTRAVENOUS at 09:43

## 2020-05-04 RX ADMIN — MIDAZOLAM HYDROCHLORIDE 1 MG: 2 INJECTION, SOLUTION INTRAMUSCULAR; INTRAVENOUS at 09:19

## 2020-05-04 RX ADMIN — SODIUM CHLORIDE, POTASSIUM CHLORIDE, SODIUM LACTATE AND CALCIUM CHLORIDE: 600; 310; 30; 20 INJECTION, SOLUTION INTRAVENOUS at 07:32

## 2020-05-04 RX ADMIN — EPHEDRINE SULFATE 15 MG: 50 INJECTION INTRAVENOUS at 10:01

## 2020-05-04 RX ADMIN — PHENYLEPHRINE HYDROCHLORIDE 10 MCG/MIN: 10 INJECTION INTRAVENOUS at 09:47

## 2020-05-04 RX ADMIN — SODIUM CHLORIDE, SODIUM LACTATE, POTASSIUM CHLORIDE, AND CALCIUM CHLORIDE: 600; 310; 30; 20 INJECTION, SOLUTION INTRAVENOUS at 09:31

## 2020-05-04 RX ADMIN — FENTANYL CITRATE 25 MCG: 50 INJECTION INTRAMUSCULAR; INTRAVENOUS at 09:31

## 2020-05-04 RX ADMIN — ONDANSETRON 4 MG: 2 INJECTION INTRAMUSCULAR; INTRAVENOUS at 11:57

## 2020-05-04 RX ADMIN — HYDROMORPHONE HYDROCHLORIDE 0.25 MG: 2 INJECTION, SOLUTION INTRAMUSCULAR; INTRAVENOUS; SUBCUTANEOUS at 11:18

## 2020-05-04 RX ADMIN — CEFTRIAXONE SODIUM 2 G: 2 INJECTION, POWDER, FOR SOLUTION INTRAMUSCULAR; INTRAVENOUS at 09:27

## 2020-05-04 RX ADMIN — SODIUM CHLORIDE, SODIUM LACTATE, POTASSIUM CHLORIDE, AND CALCIUM CHLORIDE: 600; 310; 30; 20 INJECTION, SOLUTION INTRAVENOUS at 10:21

## 2020-05-04 RX ADMIN — ROCURONIUM BROMIDE 20 MG: 10 INJECTION, SOLUTION INTRAVENOUS at 10:35

## 2020-05-04 RX ADMIN — METRONIDAZOLE 500 MG: 500 INJECTION, SOLUTION INTRAVENOUS at 09:30

## 2020-05-04 ASSESSMENT — PULMONARY FUNCTION TESTS
PIF_VALUE: 22
PIF_VALUE: 32
PIF_VALUE: 34
PIF_VALUE: 0
PIF_VALUE: 33
PIF_VALUE: 23
PIF_VALUE: 19
PIF_VALUE: 21
PIF_VALUE: 30
PIF_VALUE: 33
PIF_VALUE: 1
PIF_VALUE: 34
PIF_VALUE: 32
PIF_VALUE: 31
PIF_VALUE: 32
PIF_VALUE: 23
PIF_VALUE: 32
PIF_VALUE: 2
PIF_VALUE: 34
PIF_VALUE: 33
PIF_VALUE: 31
PIF_VALUE: 23
PIF_VALUE: 33
PIF_VALUE: 22
PIF_VALUE: 31
PIF_VALUE: 34
PIF_VALUE: 29
PIF_VALUE: 27
PIF_VALUE: 22
PIF_VALUE: 34
PIF_VALUE: 29
PIF_VALUE: 26
PIF_VALUE: 34
PIF_VALUE: 22
PIF_VALUE: 1
PIF_VALUE: 31
PIF_VALUE: 32
PIF_VALUE: 34
PIF_VALUE: 21
PIF_VALUE: 31
PIF_VALUE: 21
PIF_VALUE: 31
PIF_VALUE: 32
PIF_VALUE: 23
PIF_VALUE: 34
PIF_VALUE: 22
PIF_VALUE: 33
PIF_VALUE: 29
PIF_VALUE: 30
PIF_VALUE: 21
PIF_VALUE: 32
PIF_VALUE: 4
PIF_VALUE: 22
PIF_VALUE: 30
PIF_VALUE: 31
PIF_VALUE: 32
PIF_VALUE: 33
PIF_VALUE: 22
PIF_VALUE: 32
PIF_VALUE: 34
PIF_VALUE: 29
PIF_VALUE: 33
PIF_VALUE: 31
PIF_VALUE: 31
PIF_VALUE: 22
PIF_VALUE: 32
PIF_VALUE: 34
PIF_VALUE: 0
PIF_VALUE: 22
PIF_VALUE: 0
PIF_VALUE: 33
PIF_VALUE: 29
PIF_VALUE: 30
PIF_VALUE: 22
PIF_VALUE: 31
PIF_VALUE: 23
PIF_VALUE: 21
PIF_VALUE: 33
PIF_VALUE: 21
PIF_VALUE: 30
PIF_VALUE: 0
PIF_VALUE: 32
PIF_VALUE: 30
PIF_VALUE: 29
PIF_VALUE: 22
PIF_VALUE: 4
PIF_VALUE: 29
PIF_VALUE: 21
PIF_VALUE: 32
PIF_VALUE: 34
PIF_VALUE: 0
PIF_VALUE: 22
PIF_VALUE: 33
PIF_VALUE: 34
PIF_VALUE: 21
PIF_VALUE: 23
PIF_VALUE: 22
PIF_VALUE: 33
PIF_VALUE: 30
PIF_VALUE: 23
PIF_VALUE: 21
PIF_VALUE: 30
PIF_VALUE: 21
PIF_VALUE: 35
PIF_VALUE: 30
PIF_VALUE: 30
PIF_VALUE: 34
PIF_VALUE: 30
PIF_VALUE: 34
PIF_VALUE: 1
PIF_VALUE: 21
PIF_VALUE: 30
PIF_VALUE: 34
PIF_VALUE: 22
PIF_VALUE: 32
PIF_VALUE: 32
PIF_VALUE: 29
PIF_VALUE: 21
PIF_VALUE: 33
PIF_VALUE: 33
PIF_VALUE: 0
PIF_VALUE: 0
PIF_VALUE: 34
PIF_VALUE: 29
PIF_VALUE: 1
PIF_VALUE: 33
PIF_VALUE: 31
PIF_VALUE: 0
PIF_VALUE: 33
PIF_VALUE: 32
PIF_VALUE: 22
PIF_VALUE: 34
PIF_VALUE: 22
PIF_VALUE: 30
PIF_VALUE: 21
PIF_VALUE: 33
PIF_VALUE: 34
PIF_VALUE: 22
PIF_VALUE: 32
PIF_VALUE: 22
PIF_VALUE: 21
PIF_VALUE: 28
PIF_VALUE: 21
PIF_VALUE: 22
PIF_VALUE: 23
PIF_VALUE: 2
PIF_VALUE: 22
PIF_VALUE: 8
PIF_VALUE: 30
PIF_VALUE: 34
PIF_VALUE: 30
PIF_VALUE: 0
PIF_VALUE: 34
PIF_VALUE: 34
PIF_VALUE: 22
PIF_VALUE: 0
PIF_VALUE: 4
PIF_VALUE: 30
PIF_VALUE: 31
PIF_VALUE: 21
PIF_VALUE: 32
PIF_VALUE: 29
PIF_VALUE: 31
PIF_VALUE: 21
PIF_VALUE: 22
PIF_VALUE: 21
PIF_VALUE: 45
PIF_VALUE: 0
PIF_VALUE: 33
PIF_VALUE: 22
PIF_VALUE: 21
PIF_VALUE: 24
PIF_VALUE: 30
PIF_VALUE: 22
PIF_VALUE: 22

## 2020-05-04 ASSESSMENT — PAIN SCALES - GENERAL: PAINLEVEL_OUTOF10: 0

## 2020-05-04 ASSESSMENT — PAIN - FUNCTIONAL ASSESSMENT: PAIN_FUNCTIONAL_ASSESSMENT: 0-10

## 2020-05-04 NOTE — BRIEF OP NOTE
Brief Postoperative Note      Patient: Katie Perez  YOB: 1945  MRN: [id number]    Date of Procedure: 5/4/2020    Pre-Op Diagnosis: Gallbladder mass [K82.8]    Post-Op Diagnosis: Same       Procedure(s):  ROBOTIC LAPAROSCOPIC CHOLECYSTECTOMY    Surgeon(s):  Garret Hilario MD    Assistant:  Surgical Assistant: Joe Cristina  Resident: Anastasiya Mims MD    Anesthesia: General    Estimated Blood Loss (mL): < 50    Complications: None    Specimens:   ID Type Source Tests Collected by Time Destination   A : GALLBLADDER Tissue Tissue SURGICAL PATHOLOGY Garret Hilario MD 5/4/2020 1114        Implants:  * No implants in log *      Drains:   Urethral Catheter 16 fr (Active)       Findings: GB mass    Electronically signed by Anastasiya Mims MD on 5/4/2020 at 11:56 AM

## 2020-05-04 NOTE — PROGRESS NOTES
PACU Transfer to [facility]    Vitals:    05/04/20 1315   BP: 136/70   Pulse: 87   Resp: 14   Temp: 97.5 °F (36.4 °C)   SpO2: 95%         Intake/Output Summary (Last 24 hours) at 5/4/2020 1327  Last data filed at 5/4/2020 1223  Gross per 24 hour   Intake 2000 ml   Output 1150 ml   Net 850 ml       Pain assessment:  present - adequately treated  Pain Level: 0    Patient transferred to care of [facility] RN.    5/4/2020 1:27 PM
Spoke with [facility] , requested cardiac clearance
potential side effects. 2. For comfort and safety, arrange to have someone at home with you for the first 24 hours after discharge. 3. You and your family will be given written instructions about your diet, activity, dressing care, medications, and return visits. 4. Once at home, should issues with nausea, pain, or bleeding occur, or should you notice any signs of infection, you should call your surgeon. 5. Always clean your hands before and after caring for your wound. Do not let your family touch your surgery site without cleaning their hands. 6. Narcotic pain medications can cause significant constipation. You may want to add a stool softener to your postoperative medication schedule or speak to your surgeon on how best to manage this SIDE EFFECT. SPECIAL INSTRUCTIONS     Thank you for allowing us to care for you. We strive to exceed your expectations in the delivery of care and service provided to you and your family. If you need to contact us for any reason, please call us at [phone]    Instructions reviewed with patient during preadmission testing phone interview. Laureen Gomez. 5/1/2020 .2:36 PM      ADDITIONAL EDUCATIONAL INFORMATION REVIEWED PER PHONE WITH YOU AND/OR YOUR FAMILY:  No Bring a urine sample on day of surgery  Yes Pain Goal-Taking Control of Your Pain  Yes FAQs about Surgical Site Infections  No Hibiclens® Bathing Instructions   Yes Antibacterial Soap  No Bhaskar® Wipes Bathing Instructions (Obtained from: [email or web address]://www.[employer]/. pdf )  No Incentive Spirometer Education  No Other
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
## 2020-05-04 NOTE — ANESTHESIA PRE PROCEDURE
Duane Machuca ) 146/93   03/11/20 (!) 145/92   01/16/20 (!) 156/81       NPO Status: Time of last liquid consumption: 2200                        Time of last solid consumption: 2200                        Date of last liquid consumption: 05/03/20                        Date of last solid food consumption: 05/03/20    BMI:   Wt Readings from Last 3 Encounters:   05/01/20 167 lb (75.8 kg)   04/29/20 16 lb (7.258 kg)   03/11/20 161 lb 13.1 oz (73.4 kg)     Body mass index is 31.04 kg/m². CBC:   Lab Results   Component Value Date    WBC 7.1 03/11/2020    RBC 4.53 03/11/2020    HGB 12.7 03/11/2020    HCT 39.2 03/11/2020    MCV 86.4 03/11/2020    RDW 13.5 03/11/2020     03/11/2020       CMP:   Lab Results   Component Value Date     03/11/2020    K 4.2 03/11/2020     03/11/2020    CO2 26 03/11/2020    BUN 11 03/11/2020    CREATININE 0.8 03/11/2020    GFRAA >60 03/11/2020    AGRATIO 1.0 03/11/2020    LABGLOM >60 03/11/2020    GLUCOSE 111 03/11/2020    PROT 7.5 03/11/2020    CALCIUM 9.4 03/11/2020    BILITOT 0.7 03/11/2020    ALKPHOS 145 03/11/2020     03/11/2020     03/11/2020       POC Tests: No results for input(s): POCGLU, POCNA, POCK, POCCL, POCBUN, POCHEMO, POCHCT in the last 72 hours. Coags:   Lab Results   Component Value Date    PROTIME 12.2 04/25/2018    INR 1.08 04/25/2018       HCG (If Applicable): No results found for: PREGTESTUR, PREGSERUM, HCG, HCGQUANT     ABGs: No results found for: PHART, PO2ART, OLX8DBC, NGB3AXT, BEART, O3LRECPN     Type & Screen (If Applicable):  No results found for: LABABO, [address]    Anesthesia Evaluation  Patient summary reviewed and Nursing notes reviewed  Airway: Mallampati: II  TM distance: >3 FB   Neck ROM: full  Mouth opening: > = 3 FB Dental:          Pulmonary:Negative Pulmonary ROS and normal exam  breath sounds clear to auscultation            Patient did not smoke on day of surgery.                  Cardiovascular:  Exercise tolerance: good (>4 METS),         ECG reviewed  Rhythm: regular  Rate: normal           Beta Blocker:  Not on Beta Blocker         Neuro/Psych:   Negative Neuro/Psych ROS              GI/Hepatic/Renal: Neg GI/Hepatic/Renal ROS            Endo/Other: Negative Endo/Other ROS                    Abdominal:       Abdomen: soft. Vascular: negative vascular ROS. Anesthesia Plan      general     ASA 3       Induction: intravenous. MIPS: Postoperative opioids intended and Prophylactic antiemetics administered. Anesthetic plan and risks discussed with patient. Use of blood products discussed with patient whom consented to blood products. Plan discussed with attending and CRNA.     Attending anesthesiologist reviewed and agrees with Pre Eval content              Malina Pérez DO   5/4/2020

## 2020-05-04 NOTE — ANESTHESIA POSTPROCEDURE EVALUATION
Department of Anesthesiology  Postprocedure Note    Patient: Robert Regalado  MRN: [id number]  YOB: 1945  Date of evaluation: 5/4/2020  Time:  12:46 PM     Procedure Summary     Date:  05/04/20 Room / Location:  [address] [place] [address]    Anesthesia Start:  0042 Anesthesia Stop:  9211    Procedure:  ROBOTIC LAPAROSCOPIC LIVER RESECTION/ CHOLECYSTECTOMY/ PORTAL LYMPH NODE DISSECTION/ POSSIBLE OPEN (N/A ) Diagnosis:       Gallbladder mass      (Gallbladder mass [K82.8])    Surgeon:  Mely Ruggiero MD Responsible Provider:  Josey Garvin DO    Anesthesia Type:  general ASA Status:  3          Anesthesia Type: general    Josse Phase I: Josse Score: 8    Josse Phase II:      Last vitals: Reviewed and per EMR flowsheets.        Anesthesia Post Evaluation    Patient location during evaluation: PACU  Patient participation: complete - patient participated  Level of consciousness: awake  Pain score: 4  Airway patency: patent  Nausea & Vomiting: no nausea and no vomiting  Complications: no  Cardiovascular status: blood pressure returned to baseline  Respiratory status: acceptable  Hydration status: euvolemic

## 2022-02-11 LAB — MAMMOGRAPHY, EXTERNAL: NORMAL

## 2022-03-14 ENCOUNTER — HOSPITAL ENCOUNTER (OUTPATIENT)
Age: 77
Setting detail: OBSERVATION
Discharge: HOME | End: 2022-03-17
Attending: EMERGENCY MEDICINE | Admitting: FAMILY MEDICINE
Payer: MEDICARE
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
## 2022-03-14 DIAGNOSIS — K92.2 LOWER GI BLEED: Primary | ICD-10-CM

## 2022-03-14 LAB
ANION GAP SERPL CALCULATED.3IONS-SCNC: 13 MMOL/L (ref 3–16)
BASOPHILS ABSOLUTE: 0 K/UL (ref 0–0.2)
BASOPHILS RELATIVE PERCENT: 0.2 %
BUN BLDV-MCNC: 20 MG/DL (ref 7–20)
CALCIUM SERPL-MCNC: 9.1 MG/DL (ref 8.3–10.6)
CHLORIDE BLD-SCNC: 105 MMOL/L (ref 99–110)
CO2: 22 MMOL/L (ref 21–32)
CREAT SERPL-MCNC: 1 MG/DL (ref 0.6–1.2)
EOSINOPHILS ABSOLUTE: 0 K/UL (ref 0–0.6)
EOSINOPHILS RELATIVE PERCENT: 0 %
GFR AFRICAN AMERICAN: >60
GFR NON-AFRICAN AMERICAN: 54
GLUCOSE BLD-MCNC: 160 MG/DL (ref 70–99)
HCT VFR BLD CALC: 33.3 % (ref 36–48)
HEMOGLOBIN: 10.5 G/DL (ref 12–16)
LYMPHOCYTES ABSOLUTE: 0.8 K/UL (ref 1–5.1)
LYMPHOCYTES RELATIVE PERCENT: 7.8 %
MCH RBC QN AUTO: 26.5 PG (ref 26–34)
MCHC RBC AUTO-ENTMCNC: 31.6 G/DL (ref 31–36)
MCV RBC AUTO: 83.7 FL (ref 80–100)
MONOCYTES ABSOLUTE: 0.4 K/UL (ref 0–1.3)
MONOCYTES RELATIVE PERCENT: 3.5 %
NEUTROPHILS ABSOLUTE: 9.6 K/UL (ref 1.7–7.7)
NEUTROPHILS RELATIVE PERCENT: 88.5 %
PDW BLD-RTO: 14.6 % (ref 12.4–15.4)
PLATELET # BLD: 175 K/UL (ref 135–450)
PMV BLD AUTO: 8.4 FL (ref 5–10.5)
POTASSIUM REFLEX MAGNESIUM: 3.8 MMOL/L (ref 3.5–5.1)
RBC # BLD: 3.98 M/UL (ref 4–5.2)
SODIUM BLD-SCNC: 140 MMOL/L (ref 136–145)
WBC # BLD: 10.8 K/UL (ref 4–11)

## 2022-03-14 PROCEDURE — 85025 COMPLETE CBC W/AUTO DIFF WBC: CPT

## 2022-03-14 PROCEDURE — 36415 COLL VENOUS BLD VENIPUNCTURE: CPT

## 2022-03-14 PROCEDURE — 80076 HEPATIC FUNCTION PANEL: CPT

## 2022-03-14 PROCEDURE — 99284 EMERGENCY DEPT VISIT MOD MDM: CPT

## 2022-03-14 PROCEDURE — 80048 BASIC METABOLIC PNL TOTAL CA: CPT

## 2022-03-14 PROCEDURE — 93005 ELECTROCARDIOGRAM TRACING: CPT | Performed by: EMERGENCY MEDICINE

## 2022-03-14 ASSESSMENT — PAIN DESCRIPTION - DESCRIPTORS: DESCRIPTORS: ACHING

## 2022-03-14 ASSESSMENT — PAIN DESCRIPTION - PAIN TYPE: TYPE: ACUTE PAIN

## 2022-03-14 ASSESSMENT — PAIN DESCRIPTION - LOCATION: LOCATION: BACK

## 2022-03-14 ASSESSMENT — PAIN DESCRIPTION - ORIENTATION: ORIENTATION: LOWER

## 2022-03-14 ASSESSMENT — PAIN DESCRIPTION - FREQUENCY: FREQUENCY: CONTINUOUS

## 2022-03-14 ASSESSMENT — PAIN SCALES - GENERAL: PAINLEVEL_OUTOF10: 0

## 2022-03-15 ENCOUNTER — APPOINTMENT (OUTPATIENT)
Dept: CT IMAGING | Age: 77
End: 2022-03-15
Payer: MEDICARE

## 2022-03-15 PROBLEM — K92.2 GI BLEED: Status: ACTIVE | Noted: 2022-03-15

## 2022-03-15 PROBLEM — K92.2 LOWER GI BLEEDING: Status: ACTIVE | Noted: 2022-03-15

## 2022-03-15 LAB
A/G RATIO: 1.3 (ref 1.1–2.2)
ABO/RH: NORMAL
ALBUMIN SERPL-MCNC: 3.8 G/DL (ref 3.4–5)
ALBUMIN SERPL-MCNC: 4 G/DL (ref 3.4–5)
ALP BLD-CCNC: 111 U/L (ref 40–129)
ALP BLD-CCNC: 114 U/L (ref 40–129)
ALT SERPL-CCNC: 16 U/L (ref 10–40)
ALT SERPL-CCNC: 16 U/L (ref 10–40)
ANION GAP SERPL CALCULATED.3IONS-SCNC: 17 MMOL/L (ref 3–16)
ANTIBODY SCREEN: NORMAL
AST SERPL-CCNC: 20 U/L (ref 15–37)
AST SERPL-CCNC: 29 U/L (ref 15–37)
BILIRUB SERPL-MCNC: 0.4 MG/DL (ref 0–1)
BILIRUB SERPL-MCNC: 0.7 MG/DL (ref 0–1)
BILIRUBIN DIRECT: <0.2 MG/DL (ref 0–0.3)
BILIRUBIN, INDIRECT: NORMAL MG/DL (ref 0–1)
BUN BLDV-MCNC: 18 MG/DL (ref 7–20)
CALCIUM SERPL-MCNC: 9.1 MG/DL (ref 8.3–10.6)
CHLORIDE BLD-SCNC: 102 MMOL/L (ref 99–110)
CO2: 20 MMOL/L (ref 21–32)
CREAT SERPL-MCNC: 0.9 MG/DL (ref 0.6–1.2)
EKG ATRIAL RATE: 82 BPM
EKG DIAGNOSIS: NORMAL
EKG P AXIS: 70 DEGREES
EKG P-R INTERVAL: 162 MS
EKG Q-T INTERVAL: 402 MS
EKG QRS DURATION: 106 MS
EKG QTC CALCULATION (BAZETT): 469 MS
EKG R AXIS: 55 DEGREES
EKG T AXIS: 37 DEGREES
EKG VENTRICULAR RATE: 82 BPM
GFR AFRICAN AMERICAN: >60
GFR NON-AFRICAN AMERICAN: >60
GLUCOSE BLD-MCNC: 114 MG/DL (ref 70–99)
HCT VFR BLD CALC: 24.9 % (ref 36–48)
HCT VFR BLD CALC: 26.6 % (ref 36–48)
HCT VFR BLD CALC: 28.8 % (ref 36–48)
HCT VFR BLD CALC: 31 % (ref 36–48)
HEMOGLOBIN: 10 G/DL (ref 12–16)
HEMOGLOBIN: 7.9 G/DL (ref 12–16)
HEMOGLOBIN: 8.6 G/DL (ref 12–16)
HEMOGLOBIN: 9.1 G/DL (ref 12–16)
INR BLD: 1.16 (ref 0.88–1.12)
MCH RBC QN AUTO: 26.7 PG (ref 26–34)
MCHC RBC AUTO-ENTMCNC: 32.2 G/DL (ref 31–36)
MCV RBC AUTO: 82.9 FL (ref 80–100)
OCCULT BLOOD DIAGNOSTIC: ABNORMAL
PDW BLD-RTO: 14.9 % (ref 12.4–15.4)
PLATELET # BLD: 183 K/UL (ref 135–450)
PMV BLD AUTO: 8.2 FL (ref 5–10.5)
POTASSIUM REFLEX MAGNESIUM: 3.7 MMOL/L (ref 3.5–5.1)
PROTHROMBIN TIME: 13.2 SEC (ref 9.9–12.7)
RBC # BLD: 3.74 M/UL (ref 4–5.2)
SODIUM BLD-SCNC: 139 MMOL/L (ref 136–145)
TOTAL PROTEIN: 6.8 G/DL (ref 6.4–8.2)
TOTAL PROTEIN: 7.1 G/DL (ref 6.4–8.2)
WBC # BLD: 9.5 K/UL (ref 4–11)

## 2022-03-15 PROCEDURE — 85027 COMPLETE CBC AUTOMATED: CPT

## 2022-03-15 PROCEDURE — 74174 CTA ABD&PLVS W/CONTRAST: CPT

## 2022-03-15 PROCEDURE — 85014 HEMATOCRIT: CPT

## 2022-03-15 PROCEDURE — 86901 BLOOD TYPING SEROLOGIC RH(D): CPT

## 2022-03-15 PROCEDURE — 85610 PROTHROMBIN TIME: CPT

## 2022-03-15 PROCEDURE — 6360000004 HC RX CONTRAST MEDICATION: Performed by: EMERGENCY MEDICINE

## 2022-03-15 PROCEDURE — C9113 INJ PANTOPRAZOLE SODIUM, VIA: HCPCS | Performed by: NURSE PRACTITIONER

## 2022-03-15 PROCEDURE — 36415 COLL VENOUS BLD VENIPUNCTURE: CPT

## 2022-03-15 PROCEDURE — 94761 N-INVAS EAR/PLS OXIMETRY MLT: CPT

## 2022-03-15 PROCEDURE — 2580000003 HC RX 258: Performed by: NURSE PRACTITIONER

## 2022-03-15 PROCEDURE — 96374 THER/PROPH/DIAG INJ IV PUSH: CPT

## 2022-03-15 PROCEDURE — 80053 COMPREHEN METABOLIC PANEL: CPT

## 2022-03-15 PROCEDURE — G0378 HOSPITAL OBSERVATION PER HR: HCPCS

## 2022-03-15 PROCEDURE — 93010 ELECTROCARDIOGRAM REPORT: CPT | Performed by: INTERNAL MEDICINE

## 2022-03-15 PROCEDURE — G0328 FECAL BLOOD SCRN IMMUNOASSAY: HCPCS

## 2022-03-15 PROCEDURE — 6360000002 HC RX W HCPCS: Performed by: NURSE PRACTITIONER

## 2022-03-15 PROCEDURE — 86900 BLOOD TYPING SEROLOGIC ABO: CPT

## 2022-03-15 PROCEDURE — 85018 HEMOGLOBIN: CPT

## 2022-03-15 PROCEDURE — 86850 RBC ANTIBODY SCREEN: CPT

## 2022-03-15 RX ORDER — SODIUM CHLORIDE 9 MG/ML
25 INJECTION, SOLUTION INTRAVENOUS PRN
Status: DISCONTINUED | OUTPATIENT
Start: 2022-03-15 | End: 2022-03-17 | Stop reason: HOSPADM

## 2022-03-15 RX ORDER — MORPHINE SULFATE 2 MG/ML
2 INJECTION, SOLUTION INTRAMUSCULAR; INTRAVENOUS EVERY 4 HOURS PRN
Status: DISCONTINUED | OUTPATIENT
Start: 2022-03-15 | End: 2022-03-17 | Stop reason: HOSPADM

## 2022-03-15 RX ORDER — HYDROCODONE BITARTRATE AND ACETAMINOPHEN 5; 325 MG/1; MG/1
1 TABLET ORAL EVERY 6 HOURS PRN
Status: DISCONTINUED | OUTPATIENT
Start: 2022-03-15 | End: 2022-03-17 | Stop reason: HOSPADM

## 2022-03-15 RX ORDER — ACETAMINOPHEN 325 MG/1
650 TABLET ORAL EVERY 6 HOURS PRN
Status: DISCONTINUED | OUTPATIENT
Start: 2022-03-15 | End: 2022-03-17 | Stop reason: HOSPADM

## 2022-03-15 RX ORDER — CLINDAMYCIN PHOSPHATE 600 MG/50ML
600 INJECTION INTRAVENOUS EVERY 8 HOURS
Status: DISCONTINUED | OUTPATIENT
Start: 2022-03-15 | End: 2022-03-15

## 2022-03-15 RX ORDER — PANTOPRAZOLE SODIUM 40 MG/10ML
40 INJECTION, POWDER, LYOPHILIZED, FOR SOLUTION INTRAVENOUS DAILY
Status: DISCONTINUED | OUTPATIENT
Start: 2022-03-15 | End: 2022-03-17 | Stop reason: HOSPADM

## 2022-03-15 RX ORDER — ONDANSETRON 2 MG/ML
4 INJECTION INTRAMUSCULAR; INTRAVENOUS EVERY 6 HOURS PRN
Status: DISCONTINUED | OUTPATIENT
Start: 2022-03-15 | End: 2022-03-17 | Stop reason: HOSPADM

## 2022-03-15 RX ORDER — ONDANSETRON 4 MG/1
4 TABLET, ORALLY DISINTEGRATING ORAL EVERY 8 HOURS PRN
Status: DISCONTINUED | OUTPATIENT
Start: 2022-03-15 | End: 2022-03-17 | Stop reason: HOSPADM

## 2022-03-15 RX ORDER — ACETAMINOPHEN 650 MG/1
650 SUPPOSITORY RECTAL EVERY 6 HOURS PRN
Status: DISCONTINUED | OUTPATIENT
Start: 2022-03-15 | End: 2022-03-17 | Stop reason: HOSPADM

## 2022-03-15 RX ORDER — SODIUM CHLORIDE 9 MG/ML
INJECTION, SOLUTION INTRAVENOUS CONTINUOUS
Status: DISCONTINUED | OUTPATIENT
Start: 2022-03-15 | End: 2022-03-17 | Stop reason: HOSPADM

## 2022-03-15 RX ORDER — SODIUM CHLORIDE 0.9 % (FLUSH) 0.9 %
5-40 SYRINGE (ML) INJECTION PRN
Status: DISCONTINUED | OUTPATIENT
Start: 2022-03-15 | End: 2022-03-17 | Stop reason: HOSPADM

## 2022-03-15 RX ORDER — LABETALOL HYDROCHLORIDE 5 MG/ML
10 INJECTION, SOLUTION INTRAVENOUS EVERY 6 HOURS PRN
Status: DISCONTINUED | OUTPATIENT
Start: 2022-03-15 | End: 2022-03-17 | Stop reason: HOSPADM

## 2022-03-15 RX ORDER — SODIUM CHLORIDE 0.9 % (FLUSH) 0.9 %
5-40 SYRINGE (ML) INJECTION EVERY 12 HOURS SCHEDULED
Status: DISCONTINUED | OUTPATIENT
Start: 2022-03-15 | End: 2022-03-17 | Stop reason: HOSPADM

## 2022-03-15 RX ADMIN — SODIUM CHLORIDE: 9 INJECTION, SOLUTION INTRAVENOUS at 05:35

## 2022-03-15 RX ADMIN — PANTOPRAZOLE SODIUM 40 MG: 40 INJECTION, POWDER, FOR SOLUTION INTRAVENOUS at 11:48

## 2022-03-15 RX ADMIN — IOPAMIDOL 75 ML: 755 INJECTION, SOLUTION INTRAVENOUS at 01:47

## 2022-03-15 RX ADMIN — SODIUM CHLORIDE: 9 INJECTION, SOLUTION INTRAVENOUS at 19:39

## 2022-03-15 ASSESSMENT — PAIN SCALES - GENERAL
PAINLEVEL_OUTOF10: 0
PAINLEVEL_OUTOF10: 4

## 2022-03-15 ASSESSMENT — PAIN DESCRIPTION - DESCRIPTORS: DESCRIPTORS: ACHING;SORE

## 2022-03-15 ASSESSMENT — PAIN DESCRIPTION - FREQUENCY: FREQUENCY: CONTINUOUS

## 2022-03-15 ASSESSMENT — PAIN DESCRIPTION - LOCATION: LOCATION: BACK

## 2022-03-15 ASSESSMENT — PAIN DESCRIPTION - PAIN TYPE: TYPE: ACUTE PAIN

## 2022-03-15 ASSESSMENT — PAIN DESCRIPTION - ONSET: ONSET: ON-GOING

## 2022-03-15 ASSESSMENT — PAIN - FUNCTIONAL ASSESSMENT: PAIN_FUNCTIONAL_ASSESSMENT: ACTIVITIES ARE NOT PREVENTED

## 2022-03-15 ASSESSMENT — PAIN DESCRIPTION - PROGRESSION: CLINICAL_PROGRESSION: NOT CHANGED

## 2022-03-15 ASSESSMENT — PAIN DESCRIPTION - ORIENTATION: ORIENTATION: MID

## 2022-03-15 NOTE — PLAN OF CARE
Problem: Falls - Risk of:  Goal: Will remain free from falls  Description: Will remain free from falls  3/15/2022 0827 by Galina Gould RN  Outcome: Ongoing     Problem: Falls - Risk of:  Goal: Absence of physical injury  Description: Absence of physical injury  Outcome: Ongoing     Problem: Discharge Planning:  Goal: Discharged to appropriate level of care  Description: Discharged to appropriate level of care  Outcome: Ongoing     Problem:  Bowel Function - Altered:  Goal: Bowel elimination is within specified parameters  Description: Bowel elimination is within specified parameters  3/15/2022 0827 by Galina Gould RN  Outcome: Ongoing     Problem: Fluid Volume - Imbalance:  Goal: Will show no signs and symptoms of excessive bleeding  Description: Will show no signs and symptoms of excessive bleeding  3/15/2022 0827 by Galina Gould RN  Outcome: Ongoing     Problem: Fluid Volume - Imbalance:  Goal: Absence of imbalanced fluid volume signs and symptoms  Description: Absence of imbalanced fluid volume signs and symptoms  Outcome: Ongoing

## 2022-03-15 NOTE — ED NOTES
Patient ambulated on her own to the BR, with a steady gait and back to her room.       Xiomara Martinez RN  03/15/22 7491

## 2022-03-15 NOTE — PROGRESS NOTES
Pt arrived to floor via stretcher from ED and ambulated to bed. Telemetry activated and confirmed with CMU. Patient oriented to room and use of call light. Call light and personal items within reach. Admission and assessment initiated. POC and education initiated and reviewed with patient. Denied further needs or questions at this time. Will continue to monitor.     Electronically signed by Riccardo Zepeda RN on 3/15/2022 at 6:04 AM
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
## 2022-03-15 NOTE — CARE COORDINATION
Case Management Assessment           Initial Evaluation                Date / Time of Evaluation: 3/15/2022 11:51 AM                 Assessment Completed by: Yessy Gould RN     Met with Ms. Calvin Pinto at the bedside to complete initial assessment. She was lying in the bed watching television. She is alert and oriented x 4, pleasant, and easy to engage in conversation. She stated she lives at home with her . She is completely independent with self care and functional mobility. She is open to home care if recommended but does not think it will be necessary. She will return home at discharge. Family will provide transportation. Patient Name: Pat Abad     YOB: 1945  Diagnosis: GI bleed [K92.2]  Lower GI bleed [K92.2]  Fall in home, initial encounter [W19. Ana Trinidad, N62.403]  Lower GI bleeding [K92.2]     Date / Time: 3/14/2022  8:04 PM    Patient Admission Status: Inpatient    Current PCP: Alan Hobson MD    Chart Reviewed: Yes  Patient/ Family Interviewed: Yes    Emergency Contacts:  Extended Emergency Contact Information  Primary Emergency Contact: [address] [facility] Phone: [phone]  Mobile Phone: [phone]  Relation: Spouse    Advance Directives:   Code Status: Full Code    Financial  Payor: Guy Saenz / Plan: Mel Landa ESSENTIAL/PLUS / Product Type: *No Product type* /     Pharmacy    [facility] [address] [place] [address], [address] [place] [address] [phone]  [address] [address]  Phone: [phone] Fax: [phone]      Potential assistance Purchasing Medications: Potential Assistance Purchasing Medications: No  Does Patient want to participate in local refill/ meds to beds program?: Yes    Meds To Beds General Rules:  1. Can ONLY be done Monday- Friday between 8:30am-5pm  2. Prescription(s) must be in pharmacy by 3pm to be filled same day  3. Copy of patient's insurance/ prescription drug card and patient face sheet must be sent along with the prescription(s)  4. Cost of Rx cannot be added to hospital bill. If financial assistance is needed, please contact unit  or ;  or  CANNOT provide pharmacy voucher for patients co-pays  5. Patients can then  the prescription on their way out of the hospital at discharge, or pharmacy can deliver to the bedside if staff is available. (payment due at time of pick-up or delivery - cash, check, or card accepted)     Able to afford home medications/ co-pay costs: Yes    ADLS  Support Systems: Spouse/Significant Other,Children,Family Members    HOUSING  Home Environment: house with spouse    Plans to RETURN to current housing: Yes    Hiren Vidal 80  Currently ACTIVE with [address]: No    DISCHARGE PLAN:  Disposition: Home- No Services Needed    Transportation PLAN for discharge: family     The Patient and/or patient representative Trevor and her family were provided with a choice of provider and agrees with the discharge plan Yes    Freedom of choice list was provided with basic dialogue that supports the patient's individualized plan of care/goals and shares the quality data associated with the providers.  Yes    Care Transition patient: Yes    Bindu José RN  [phone]
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
## 2022-03-15 NOTE — ACP (ADVANCE CARE PLANNING)
Advance Care Planning     Advance Care Planning Activator (Inpatient)  Conversation Note      Date of ACP Conversation: 3/15/2022     Conversation Conducted with: Patient with Decision Making Capacity    ACP Activator: Farzana Turcios RN    Health Care Decision Maker:     Current Designated Health Care Decision Maker:     Primary Decision Maker: Jazz Zhong - [phone]    Today we discussed advanced directives. Ms. Ardie Snellen stated she does not have advanced directives. She understands her spouse would be her decision-maker if she was incapacitated. She wishes to be a FULL CODE     [x] Yes   [] No   Educated Patient / True Graven regarding differences between Advance Directives and portable DNR orders.     Length of ACP Conversation in minutes:   5    Conversation Outcomes:  [x] ACP discussion completed  [] Existing advance directive reviewed with patient; no changes to patient's previously recorded wishes  [] New Advance Directive completed  [] Portable Do Not Rescitate prepared for Provider review and signature  [] POLST/POST/MOLST/MOST prepared for Provider review and signature      Follow-up plan:    [] Schedule follow-up conversation to continue planning  [] Referred individual to Provider for additional questions/concerns   [] Advised patient/agent/surrogate to review completed ACP document and update if needed with changes in condition, patient preferences or care setting    [x] This note routed to one or more involved healthcare providers      Electronically signed by Farzana Turcios RN on 3/15/2022 at 11:49 AM
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
## 2022-03-16 ENCOUNTER — ANESTHESIA EVENT (OUTPATIENT)
Dept: ENDOSCOPY | Age: 77
End: 2022-03-16
Payer: MEDICARE

## 2022-03-16 ENCOUNTER — APPOINTMENT (OUTPATIENT)
Dept: GENERAL RADIOLOGY | Age: 77
End: 2022-03-16
Payer: MEDICARE

## 2022-03-16 LAB
HCT VFR BLD CALC: 24.5 % (ref 36–48)
HCT VFR BLD CALC: 24.8 % (ref 36–48)
HCT VFR BLD CALC: 26.6 % (ref 36–48)
HCT VFR BLD CALC: 27 % (ref 36–48)
HEMOGLOBIN: 7.9 G/DL (ref 12–16)
HEMOGLOBIN: 8 G/DL (ref 12–16)
HEMOGLOBIN: 8.5 G/DL (ref 12–16)
HEMOGLOBIN: 8.6 G/DL (ref 12–16)
IRON SATURATION: 15 % (ref 15–50)
IRON: 38 UG/DL (ref 37–145)
TOTAL IRON BINDING CAPACITY: 250 UG/DL (ref 260–445)

## 2022-03-16 PROCEDURE — 6370000000 HC RX 637 (ALT 250 FOR IP): Performed by: INTERNAL MEDICINE

## 2022-03-16 PROCEDURE — 36415 COLL VENOUS BLD VENIPUNCTURE: CPT

## 2022-03-16 PROCEDURE — G0378 HOSPITAL OBSERVATION PER HR: HCPCS

## 2022-03-16 PROCEDURE — 83540 ASSAY OF IRON: CPT

## 2022-03-16 PROCEDURE — 85018 HEMOGLOBIN: CPT

## 2022-03-16 PROCEDURE — C9113 INJ PANTOPRAZOLE SODIUM, VIA: HCPCS | Performed by: NURSE PRACTITIONER

## 2022-03-16 PROCEDURE — 96376 TX/PRO/DX INJ SAME DRUG ADON: CPT

## 2022-03-16 PROCEDURE — 6360000002 HC RX W HCPCS: Performed by: NURSE PRACTITIONER

## 2022-03-16 PROCEDURE — 85014 HEMATOCRIT: CPT

## 2022-03-16 PROCEDURE — 94761 N-INVAS EAR/PLS OXIMETRY MLT: CPT

## 2022-03-16 PROCEDURE — 72100 X-RAY EXAM L-S SPINE 2/3 VWS: CPT

## 2022-03-16 PROCEDURE — 83550 IRON BINDING TEST: CPT

## 2022-03-16 PROCEDURE — 2580000003 HC RX 258: Performed by: NURSE PRACTITIONER

## 2022-03-16 RX ADMIN — SODIUM CHLORIDE, PRESERVATIVE FREE 10 ML: 5 INJECTION INTRAVENOUS at 08:24

## 2022-03-16 RX ADMIN — MAGNESIUM CITRATE 296 ML: 1.75 LIQUID ORAL at 22:09

## 2022-03-16 RX ADMIN — SODIUM CHLORIDE: 9 INJECTION, SOLUTION INTRAVENOUS at 11:14

## 2022-03-16 RX ADMIN — MAGNESIUM CITRATE 296 ML: 1.75 LIQUID ORAL at 16:14

## 2022-03-16 RX ADMIN — PANTOPRAZOLE SODIUM 40 MG: 40 INJECTION, POWDER, FOR SOLUTION INTRAVENOUS at 08:24

## 2022-03-16 RX ADMIN — BISACODYL 10 MG: 5 TABLET, COATED ORAL at 16:14

## 2022-03-16 RX ADMIN — BISACODYL 10 MG: 5 TABLET, COATED ORAL at 22:09

## 2022-03-16 ASSESSMENT — PAIN SCALES - GENERAL
PAINLEVEL_OUTOF10: 0
PAINLEVEL_OUTOF10: 4

## 2022-03-16 ASSESSMENT — ENCOUNTER SYMPTOMS
RHINORRHEA: 0
ANAL BLEEDING: 1
SHORTNESS OF BREATH: 0
ABDOMINAL PAIN: 0
BLOOD IN STOOL: 1
SORE THROAT: 0
BACK PAIN: 0
EYE PAIN: 0
NAUSEA: 0
CONSTIPATION: 0
DIARRHEA: 0
COUGH: 0
VOMITING: 0

## 2022-03-16 ASSESSMENT — PAIN DESCRIPTION - PROGRESSION: CLINICAL_PROGRESSION: NOT CHANGED

## 2022-03-16 ASSESSMENT — PAIN DESCRIPTION - FREQUENCY: FREQUENCY: CONTINUOUS

## 2022-03-16 ASSESSMENT — PAIN - FUNCTIONAL ASSESSMENT: PAIN_FUNCTIONAL_ASSESSMENT: ACTIVITIES ARE NOT PREVENTED

## 2022-03-16 ASSESSMENT — PAIN DESCRIPTION - LOCATION: LOCATION: BACK

## 2022-03-16 ASSESSMENT — PAIN DESCRIPTION - DESCRIPTORS: DESCRIPTORS: ACHING;SORE

## 2022-03-16 ASSESSMENT — PAIN DESCRIPTION - ONSET: ONSET: ON-GOING

## 2022-03-16 ASSESSMENT — PAIN DESCRIPTION - PAIN TYPE: TYPE: ACUTE PAIN

## 2022-03-16 ASSESSMENT — PAIN DESCRIPTION - ORIENTATION: ORIENTATION: MID
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
## 2022-03-16 NOTE — PROGRESS NOTES
[facility]    Hgb 7.9  Bleeding has slowed but persists  I will proceed with colonoscopy tomorrow  In the interim, continue to monitor the Hgb with transfusion as necessary
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
## 2022-03-16 NOTE — PLAN OF CARE
Problem: Falls - Risk of:  Goal: Will remain free from falls  Description: Will remain free from falls  3/16/2022 0826 by Raudel Sands RN  Outcome: Ongoing     Problem: Falls - Risk of:  Goal: Absence of physical injury  Description: Absence of physical injury  Outcome: Ongoing     Problem: Discharge Planning:  Goal: Discharged to appropriate level of care  Description: Discharged to appropriate level of care  Outcome: Ongoing     Problem:  Bowel Function - Altered:  Goal: Bowel elimination is within specified parameters  Description: Bowel elimination is within specified parameters  3/16/2022 0826 by Raudel Sands RN  Outcome: Ongoing     Problem: Fluid Volume - Imbalance:  Goal: Will show no signs and symptoms of excessive bleeding  Description: Will show no signs and symptoms of excessive bleeding  3/16/2022 0826 by Raudel Sands RN  Outcome: Ongoing     Problem: Fluid Volume - Imbalance:  Goal: Absence of imbalanced fluid volume signs and symptoms  Description: Absence of imbalanced fluid volume signs and symptoms  Outcome: Ongoing

## 2022-03-16 NOTE — PLAN OF CARE
Problem: Falls - Risk of:  Goal: Will remain free from falls  Description: Will remain free from falls  Outcome: Ongoing     Problem:  Bowel Function - Altered:  Goal: Bowel elimination is within specified parameters  Description: Bowel elimination is within specified parameters  Outcome: Ongoing     Problem: Fluid Volume - Imbalance:  Goal: Will show no signs and symptoms of excessive bleeding  Description: Will show no signs and symptoms of excessive bleeding  Outcome: Ongoing

## 2022-03-17 ENCOUNTER — ANESTHESIA (OUTPATIENT)
Dept: ENDOSCOPY | Age: 77
End: 2022-03-17
Payer: MEDICARE

## 2022-03-17 VITALS
OXYGEN SATURATION: 98 % | RESPIRATION RATE: 22 BRPM | DIASTOLIC BLOOD PRESSURE: 73 MMHG | WEIGHT: 162.48 LBS | SYSTOLIC BLOOD PRESSURE: 130 MMHG | TEMPERATURE: 97.7 F | HEART RATE: 98 BPM | HEIGHT: 62 IN | BODY MASS INDEX: 29.9 KG/M2

## 2022-03-17 VITALS
OXYGEN SATURATION: 96 % | RESPIRATION RATE: 18 BRPM | DIASTOLIC BLOOD PRESSURE: 51 MMHG | SYSTOLIC BLOOD PRESSURE: 87 MMHG

## 2022-03-17 LAB
HCT VFR BLD CALC: 26.7 % (ref 36–48)
HCT VFR BLD CALC: 27.1 % (ref 36–48)
HEMOGLOBIN: 8.6 G/DL (ref 12–16)
HEMOGLOBIN: 8.7 G/DL (ref 12–16)

## 2022-03-17 PROCEDURE — 2500000003 HC RX 250 WO HCPCS: Performed by: NURSE ANESTHETIST, CERTIFIED REGISTERED

## 2022-03-17 PROCEDURE — 2709999900 HC NON-CHARGEABLE SUPPLY: Performed by: INTERNAL MEDICINE

## 2022-03-17 PROCEDURE — 36415 COLL VENOUS BLD VENIPUNCTURE: CPT

## 2022-03-17 PROCEDURE — G0378 HOSPITAL OBSERVATION PER HR: HCPCS

## 2022-03-17 PROCEDURE — 6360000002 HC RX W HCPCS: Performed by: NURSE PRACTITIONER

## 2022-03-17 PROCEDURE — 85014 HEMATOCRIT: CPT

## 2022-03-17 PROCEDURE — 3700000000 HC ANESTHESIA ATTENDED CARE: Performed by: INTERNAL MEDICINE

## 2022-03-17 PROCEDURE — 3700000001 HC ADD 15 MINUTES (ANESTHESIA): Performed by: INTERNAL MEDICINE

## 2022-03-17 PROCEDURE — 7100000000 HC PACU RECOVERY - FIRST 15 MIN: Performed by: INTERNAL MEDICINE

## 2022-03-17 PROCEDURE — 94760 N-INVAS EAR/PLS OXIMETRY 1: CPT

## 2022-03-17 PROCEDURE — 6360000002 HC RX W HCPCS: Performed by: NURSE ANESTHETIST, CERTIFIED REGISTERED

## 2022-03-17 PROCEDURE — 85018 HEMOGLOBIN: CPT

## 2022-03-17 PROCEDURE — C9113 INJ PANTOPRAZOLE SODIUM, VIA: HCPCS | Performed by: NURSE PRACTITIONER

## 2022-03-17 PROCEDURE — 3609027000 HC COLONOSCOPY: Performed by: INTERNAL MEDICINE

## 2022-03-17 PROCEDURE — 96376 TX/PRO/DX INJ SAME DRUG ADON: CPT

## 2022-03-17 PROCEDURE — 2580000003 HC RX 258: Performed by: NURSE PRACTITIONER

## 2022-03-17 RX ORDER — SODIUM CHLORIDE 9 MG/ML
INJECTION, SOLUTION INTRAVENOUS CONTINUOUS
Status: DISCONTINUED | OUTPATIENT
Start: 2022-03-17 | End: 2022-03-17

## 2022-03-17 RX ORDER — SODIUM CHLORIDE 9 MG/ML
25 INJECTION, SOLUTION INTRAVENOUS PRN
Status: DISCONTINUED | OUTPATIENT
Start: 2022-03-17 | End: 2022-03-17

## 2022-03-17 RX ORDER — SODIUM CHLORIDE 0.9 % (FLUSH) 0.9 %
10 SYRINGE (ML) INJECTION PRN
Status: DISCONTINUED | OUTPATIENT
Start: 2022-03-17 | End: 2022-03-17

## 2022-03-17 RX ORDER — LIDOCAINE HYDROCHLORIDE 20 MG/ML
INJECTION, SOLUTION EPIDURAL; INFILTRATION; INTRACAUDAL; PERINEURAL PRN
Status: DISCONTINUED | OUTPATIENT
Start: 2022-03-17 | End: 2022-03-17 | Stop reason: SDUPTHER

## 2022-03-17 RX ORDER — SODIUM CHLORIDE 0.9 % (FLUSH) 0.9 %
5-40 SYRINGE (ML) INJECTION EVERY 12 HOURS SCHEDULED
Status: DISCONTINUED | OUTPATIENT
Start: 2022-03-17 | End: 2022-03-17

## 2022-03-17 RX ORDER — PROPOFOL 10 MG/ML
INJECTION, EMULSION INTRAVENOUS CONTINUOUS PRN
Status: DISCONTINUED | OUTPATIENT
Start: 2022-03-17 | End: 2022-03-17 | Stop reason: SDUPTHER

## 2022-03-17 RX ORDER — SODIUM CHLORIDE 0.9 % (FLUSH) 0.9 %
5-40 SYRINGE (ML) INJECTION PRN
Status: DISCONTINUED | OUTPATIENT
Start: 2022-03-17 | End: 2022-03-17

## 2022-03-17 RX ORDER — SODIUM CHLORIDE 0.9 % (FLUSH) 0.9 %
10 SYRINGE (ML) INJECTION EVERY 12 HOURS SCHEDULED
Status: DISCONTINUED | OUTPATIENT
Start: 2022-03-17 | End: 2022-03-17

## 2022-03-17 RX ORDER — PHENYLEPHRINE HCL IN 0.9% NACL 1 MG/10 ML
SYRINGE (ML) INTRAVENOUS PRN
Status: DISCONTINUED | OUTPATIENT
Start: 2022-03-17 | End: 2022-03-17 | Stop reason: SDUPTHER

## 2022-03-17 RX ORDER — PROPOFOL 10 MG/ML
INJECTION, EMULSION INTRAVENOUS PRN
Status: DISCONTINUED | OUTPATIENT
Start: 2022-03-17 | End: 2022-03-17 | Stop reason: SDUPTHER

## 2022-03-17 RX ADMIN — SODIUM CHLORIDE: 9 INJECTION, SOLUTION INTRAVENOUS at 04:30

## 2022-03-17 RX ADMIN — PROPOFOL 80 MG: 10 INJECTION, EMULSION INTRAVENOUS at 14:04

## 2022-03-17 RX ADMIN — SODIUM CHLORIDE, PRESERVATIVE FREE 10 ML: 5 INJECTION INTRAVENOUS at 08:41

## 2022-03-17 RX ADMIN — PANTOPRAZOLE SODIUM 40 MG: 40 INJECTION, POWDER, FOR SOLUTION INTRAVENOUS at 08:40

## 2022-03-17 RX ADMIN — PROPOFOL 200 MCG/KG/MIN: 10 INJECTION, EMULSION INTRAVENOUS at 14:04

## 2022-03-17 RX ADMIN — LIDOCAINE HYDROCHLORIDE 60 MG: 20 INJECTION, SOLUTION EPIDURAL; INFILTRATION; INTRACAUDAL; PERINEURAL at 14:04

## 2022-03-17 RX ADMIN — Medication 100 MCG: at 14:10

## 2022-03-17 ASSESSMENT — PULMONARY FUNCTION TESTS
PIF_VALUE: 0
PIF_VALUE: 1
PIF_VALUE: 0
PIF_VALUE: 1
PIF_VALUE: 0
PIF_VALUE: 1

## 2022-03-17 ASSESSMENT — PAIN SCALES - GENERAL
PAINLEVEL_OUTOF10: 0

## 2022-03-17 ASSESSMENT — PAIN - FUNCTIONAL ASSESSMENT: PAIN_FUNCTIONAL_ASSESSMENT: 0-10

## 2022-03-17 ASSESSMENT — LIFESTYLE VARIABLES: SMOKING_STATUS: 0
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
## 2022-03-17 NOTE — ANESTHESIA POSTPROCEDURE EVALUATION
Department of Anesthesiology  Postprocedure Note    Patient: Ora Rodrigez  MRN: [id number]  YOB: 1945  Date of evaluation: 3/17/2022  Time:  4:49 PM     Procedure Summary     Date: 03/17/22 Room / Location: [address] [place] [address]    Anesthesia Start: 1350 Anesthesia Stop: 9519    Procedure: COLONOSCOPY (N/A ) Diagnosis:       Lower gastrointestinal bleed      (LOWER GASTROINTESTINAL BLEED)    Surgeons: Deepa Wilson MD Responsible Provider: Cm Bello MD    Anesthesia Type: general ASA Status: 3          Anesthesia Type: general    Josse Phase I: Josse Score: 9    Josse Phase II:      Last vitals: Reviewed and per EMR flowsheets.        Anesthesia Post Evaluation    Patient location during evaluation: PACU  Patient participation: complete - patient participated  Level of consciousness: awake and alert  Pain score: 0  Airway patency: patent  Nausea & Vomiting: no nausea and no vomiting  Complications: no  Cardiovascular status: blood pressure returned to baseline  Respiratory status: acceptable  Hydration status: euvolemic

## 2022-03-17 NOTE — DISCHARGE INSTR - COC
Continuity of Care Form    Patient Name: Ellie Ku   :  1945  MRN:  [id number]    Admit date:  3/14/2022  Discharge date:  ***    Code Status Order: Full Code   Advance Directives:      Admitting Physician:  Peyton Patel MD  PCP: Tina Gordon MD    Discharging Nurse: [facility] Unit/Room#: [id number]/2945-82  Discharging Unit Phone Number: ***    Emergency Contact:   Extended Emergency Contact Information  Primary Emergency Contact: [address] Phone: [phone]  Mobile Phone: [phone]  Relation: Spouse    Past Surgical History:  Past Surgical History:   Procedure Laterality Date    BACK SURGERY      HYSTERECTOMY      LIVER RESECTION N/A 2020    ROBOTIC CHOLECYSTECTOMY performed by Willi Pope MD at [address]       Immunization History:   Immunization History   Administered Date(s) Administered    Pneumococcal Polysaccharide (Migentych94) 2017       Active Problems:  Patient Active Problem List   Diagnosis Code    GI bleed K92.2    Lower GI bleeding K92.2       Isolation/Infection:   Isolation            No Isolation          Patient Infection Status       None to display            Nurse Assessment:  Last Vital Signs: /74   Pulse 96   Temp 98 °F (36.7 °C) (Oral)   Resp 22   Ht 5' 1.5\" (1.562 m)   Wt 162 lb 7.7 oz (73.7 kg)   SpO2 94%   BMI 30.20 kg/m²     Last documented pain score (0-10 scale): Pain Level: 0  Last Weight:   Wt Readings from Last 1 Encounters:   22 162 lb 7.7 oz (73.7 kg)     Mental Status:  {IP PT MENTAL STATUS:}    IV Access:  { KYLEIGH IV ACCESS:966272298}    Nursing Mobility/ADLs:  Walking   {CHP DME FHTN:422370965}  Transfer  {CHP DME ILZW:134223302}  Bathing  {CHP DME BXPJ:104325403}  Dressing  {CHP DME JZDQ:056357363}  Toileting  {CHP DME XAVQ:165706914}  Feeding  {P DME EYTE:583785735}  Med Admin  {P DME ZNWX:120239546}  Med Delivery   { KYLEIGH MED Delivery:983852282}    Wound Care Documentation SECTION    Prognosis: Good    Condition at Discharge: Stable    Rehab Potential (if transferring to Rehab): Good    Recommended Labs or Other Treatments After Discharge: PT/OT/RN    Physician Certification: I certify the above information and transfer of Jonathan Maldonado  is necessary for the continuing treatment of the diagnosis listed and that she requires [address] for less 30 days.      Update Admission H&P: No change in H&P    PHYSICIAN SIGNATURE:  Electronically signed by COCO Zuleta CNP on 3/17/22 at 9:43 AM EDT/ Dr. Judson De León
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
## 2022-03-17 NOTE — OP NOTE
Operative Note      Patient: Leonidas Mi  YOB: 1945  MRN: [id number]    Date of Procedure: 3/17/2022    Pre-Op Diagnosis: LOWER GASTROINTESTINAL BLEED    Post-Op Diagnosis: Same       Procedure(s):  COLONOSCOPY    Surgeon(s):  George Teran MD    Assistant:   * No surgical staff found *    Anesthesia: Monitor Anesthesia Care    Estimated Blood Loss (mL): None    Complications: None    Specimens:   * No specimens in log *    Implants:  * No implants in log *      Drains: * No LDAs found *    Findings: See dictated report    Detailed Description of Procedure:   See dictated report    Electronically signed by George Teran MD on 3/17/2022 at 2:22 PM

## 2022-03-17 NOTE — BRIEF OP NOTE
Brief Postoperative Note    Vinicio Lovett  YOB: 1945  [id number]      Pre-operative Diagnosis: LGI bleeding    Previous Colonoscopy: No  When: unknown  Greater than 3 years? No      Post-operative Diagnosis: Same    Procedure: Colonoscopy    The preparation was excellent.     Anesthesia: [facility]    Surgeons/Assistants: Cortney Sesay MD    Estimated Blood Loss: None    Complications: None    Specimens: Was Not Obtained    Findings: See dictated report    Electronically signed by Cortney Sesay MD on 7/10/2017 at 7:45 AM

## 2022-03-17 NOTE — PROGRESS NOTES
Report from [employer], RN. Pt understands procedure without further questions. States prep was all liquid.

## 2022-03-17 NOTE — FLOWSHEET NOTE
03/17/22 1608   IMM Letter   IMM Letter given to Patient/Family/Significant other/Guardian/POA/by: Provided to patient at bedside by Giuseppe Bello, MSW, MINDYW. Education provided, patient reported no questions and verbalized understanding. Patient reports they plan to leave prior to the 4 hours of notice of IMM Letter and reported no concerns.    IMM Letter date given: 03/17/22   IMM Letter time given: 5617

## 2022-03-17 NOTE — CARE COORDINATION
CASE MANAGEMENT DISCHARGE SUMMARY:    DISCHARGE DATE: 3/17/2022    DISCHARGED TO: Home with spouse; patient reports no needs. IMM Letter provided. HOME CARE AGENCY: Patient declined needs. Patient independent in room. Patient reports spouse will assist as needed.      TRANSPORTATION: Spouse at bedside              TIME: Patient to coordinate with RN     NANCY Fontaine, FRANK, Social Work/Case Management   [phone]  Electronically signed by NANCY Fontaine LSW on 3/17/2022 at 4:06 PM
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
## 2022-03-17 NOTE — PROGRESS NOTES
Discharge orders acknowledged by RN . Discharge teaching completed with pt and family. AVS reviewed and all questions answered. Medication regimen reviewed and pt understands schedule. Follow up appointments also reviewed with pt and resources given for discharge. IV removed. Bedside monitor removed from pt. Pt vitals WDL. Pt discharged with all belongings to home. Pt transported off of unit via wheelchair. No complications.        Electronically signed by Em Jean RN on 3/17/2022 at 5:01 PM
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
## 2022-03-18 NOTE — PROCEDURES
[address] [place] [address] Gray Hines [address]                               COLONOSCOPY REPORT    PATIENT NAME: Venu Hua                   :        1945  MED REC NO:   [id number]                          ROOM:       5106  ACCOUNT NO:   [de-identified]                           ADMIT DATE: 2022  PROVIDER:     Natalie Huerta MD      DATE OF PROCEDURE:  2022    REFERRING PROVIDERS:  Leatha Oropeza MD and Dr. Diogo Del Real.    INSTRUMENT USED:  Olympus PCF-H180AL. ANESTHESIA:  The patient was premedicated with Diprivan intravenously as  administered by the anesthesiology service. INDICATIONS:  The patient has presented with lower GI bleeding. PROCEDURE:  The digital and anal exams were normal.  The colonoscope was  inserted to the cecum. The prep was good to excellent. There was  extensive diverticulosis extending from the sigmoid colon into the  transverse colon. No other mucosal lesions were identified. There was  no evidence of blood in the entire colon. ESTIMATED BLOOD LOSS:  None. IMPRESSION:  Diverticulosis. PLAN:  The patient's bleeding appears to have been diverticular in  etiology. The bleeding has ceased. The patient's diet can be advanced,  and she can be discharged when otherwise stable. CHAPIS Sol MD    D: 2022 14:38:15       T: 2022 14:42:57     [id number]  Job#: [id number]     Doc#: [id number]    CC:  Natalie Romano MD

## 2022-03-21 ENCOUNTER — TELEPHONE (OUTPATIENT)
Dept: PRIMARY CARE CLINIC | Age: 77
End: 2022-03-21

## 2022-03-21 NOTE — TELEPHONE ENCOUNTER
[facility] Transitions Initial Follow Up Call    Outreach made within 2 business days of discharge: Yes    Patient: Pat Abad Patient : 1945   MRN: [id number]  Reason for Admission: There are no discharge diagnoses documented for the most recent discharge. Discharge Date: 3/17/22       Spoke with: Patient    Discharge department/facility: [facility] Interactive Patient Contact:  Was patient able to fill all prescriptions: Yes  Was patient instructed to bring all medications to the follow-up visit: Yes  Is patient taking all medications as directed in the discharge summary?  Yes  Does patient understand their discharge instructions: Yes  Does patient have questions or concerns that need addressed prior to 7-14 day follow up office visit: no    Scheduled appointment with PCP within 7-14 days    Follow Up  Future Appointments   Date Time Provider Judith De La Garza   3/23/2022  4:45 PM COCO Price - Rosales Warner, [address]

## 2022-03-23 ENCOUNTER — OFFICE VISIT (OUTPATIENT)
Dept: PRIMARY CARE CLINIC | Age: 77
End: 2022-03-23
Payer: MEDICARE

## 2022-03-23 VITALS
BODY MASS INDEX: 29.81 KG/M2 | HEART RATE: 86 BPM | TEMPERATURE: 96.8 F | HEIGHT: 62 IN | WEIGHT: 162 LBS | DIASTOLIC BLOOD PRESSURE: 85 MMHG | SYSTOLIC BLOOD PRESSURE: 133 MMHG | OXYGEN SATURATION: 99 %

## 2022-03-23 DIAGNOSIS — K57.90 DIVERTICULOSIS: ICD-10-CM

## 2022-03-23 DIAGNOSIS — K92.2 LOWER GI BLEEDING: Primary | ICD-10-CM

## 2022-03-23 PROCEDURE — 1111F DSCHRG MED/CURRENT MED MERGE: CPT | Performed by: NURSE PRACTITIONER

## 2022-03-23 PROCEDURE — 99496 TRANSJ CARE MGMT HIGH F2F 7D: CPT | Performed by: NURSE PRACTITIONER

## 2022-03-23 SDOH — ECONOMIC STABILITY: FOOD INSECURITY: WITHIN THE PAST 12 MONTHS, YOU WORRIED THAT YOUR FOOD WOULD RUN OUT BEFORE YOU GOT MONEY TO BUY MORE.: NEVER TRUE

## 2022-03-23 SDOH — ECONOMIC STABILITY: FOOD INSECURITY: WITHIN THE PAST 12 MONTHS, THE FOOD YOU BOUGHT JUST DIDN'T LAST AND YOU DIDN'T HAVE MONEY TO GET MORE.: NEVER TRUE

## 2022-03-23 ASSESSMENT — PATIENT HEALTH QUESTIONNAIRE - PHQ9
SUM OF ALL RESPONSES TO PHQ QUESTIONS 1-9: 0
2. FEELING DOWN, DEPRESSED OR HOPELESS: 0
SUM OF ALL RESPONSES TO PHQ QUESTIONS 1-9: 0
SUM OF ALL RESPONSES TO PHQ QUESTIONS 1-9: 0
SUM OF ALL RESPONSES TO PHQ9 QUESTIONS 1 & 2: 0
SUM OF ALL RESPONSES TO PHQ QUESTIONS 1-9: 0
1. LITTLE INTEREST OR PLEASURE IN DOING THINGS: 0

## 2022-03-23 ASSESSMENT — SOCIAL DETERMINANTS OF HEALTH (SDOH): HOW HARD IS IT FOR YOU TO PAY FOR THE VERY BASICS LIKE FOOD, HOUSING, MEDICAL CARE, AND HEATING?: SOMEWHAT HARD

## 2022-03-23 NOTE — PROGRESS NOTES
Post-Discharge Transitional Care Follow Up      Essence Hdz   YOB: 1945    Date of Office Visit:  3/23/2022  Date of Hospital Admission: 3/14/22  Date of Hospital Discharge: 3/17/22  Readmission Risk Score (high >=14%. Medium >=10%):Readmission Risk Score: 7.6 ( )      Care management risk score Rising risk (score 2-5) and Complex Care (Scores >=6): 0     Non face to face  following discharge, date last encounter closed (first attempt may have been earlier): 3/21/2022 11:32 AM     Call initiated 2 business days of discharge: Yes   LOV with Dr. Maria Del Rosario Sun in 2017 as new patient  Lower GI bleeding- denies bleeding since hospitalization  -     [place] DISCHARGE MEDS RECONCILED W/ CURRENT OUTPATIENT MED LIST  -Schedule f/u appointment with PCP  Diverticulosis- denies abdominal pain, active bleeding  -CBC    Medical Decision Making: high complexity  No follow-ups on file. On this date 3/23/2022 I have spent 20 minutes reviewing previous notes, test results and face to face with the patient discussing the diagnosis and importance of compliance with the treatment plan as well as documenting on the day of the visit. Subjective:   HPI    Inpatient course: Discharge summary reviewed- see chart. Interval history/Current status: stable    Patient Active Problem List   Diagnosis    GI bleed    Lower GI bleeding       Medications listed as ordered at the time of discharge from hospital  [unfilled]     Medications marked \"taking\" at this time  No outpatient medications have been marked as taking for the 3/23/22 encounter (Office Visit) with COCO Pratt CNP. Medications patient taking as of now reconciled against medications ordered at time of hospital discharge: Yes    Review of Systems   Constitutional: Negative for activity change and fever. HENT: Negative for congestion. Eyes: Negative for visual disturbance.    Respiratory: Negative for chest tightness and shortness of

## 2022-03-24 ASSESSMENT — ENCOUNTER SYMPTOMS
CHEST TIGHTNESS: 0
DIARRHEA: 0
SHORTNESS OF BREATH: 0
CONSTIPATION: 0
ABDOMINAL PAIN: 0

## 2022-03-25 ENCOUNTER — TELEPHONE (OUTPATIENT)
Dept: PRIMARY CARE CLINIC | Age: 77
End: 2022-03-25

## 2022-03-25 NOTE — TELEPHONE ENCOUNTER
----- Message from COCO Duarte CNP sent at 3/24/2022  7:20 AM EDT -----  Regarding: labs  Please call and remind to have CBC drawn at any [facility]

## 2022-03-29 DIAGNOSIS — K92.2 LOWER GI BLEEDING: ICD-10-CM

## 2022-03-29 DIAGNOSIS — K57.90 DIVERTICULOSIS: ICD-10-CM

## 2022-03-29 LAB
BASOPHILS ABSOLUTE: 0 K/UL (ref 0–0.2)
BASOPHILS RELATIVE PERCENT: 0.3 %
EOSINOPHILS ABSOLUTE: 0.1 K/UL (ref 0–0.6)
EOSINOPHILS RELATIVE PERCENT: 1.3 %
HCT VFR BLD CALC: 26.7 % (ref 36–48)
HEMOGLOBIN: 8.2 G/DL (ref 12–16)
LYMPHOCYTES ABSOLUTE: 2.3 K/UL (ref 1–5.1)
LYMPHOCYTES RELATIVE PERCENT: 31.7 %
MCH RBC QN AUTO: 25.2 PG (ref 26–34)
MCHC RBC AUTO-ENTMCNC: 30.7 G/DL (ref 31–36)
MCV RBC AUTO: 81.9 FL (ref 80–100)
MONOCYTES ABSOLUTE: 0.6 K/UL (ref 0–1.3)
MONOCYTES RELATIVE PERCENT: 8.8 %
NEUTROPHILS ABSOLUTE: 4.2 K/UL (ref 1.7–7.7)
NEUTROPHILS RELATIVE PERCENT: 57.9 %
PDW BLD-RTO: 15.3 % (ref 12.4–15.4)
PLATELET # BLD: 355 K/UL (ref 135–450)
PMV BLD AUTO: 7.8 FL (ref 5–10.5)
RBC # BLD: 3.26 M/UL (ref 4–5.2)
WBC # BLD: 7.2 K/UL (ref 4–11)

## 2023-06-22 NOTE — PATIENT INSTRUCTIONS
Schedule appointment with PCP in 6 months Staged Advancement Flap Text: The defect edges were debeveled with a #15 scalpel blade. Given the location of the defect, shape of the defect and the proximity to free margins a staged advancement flap was deemed most appropriate. Using a sterile surgical marker, an appropriate advancement flap was drawn incorporating the defect and placing the expected incisions within the relaxed skin tension lines where possible. The area thus outlined was incised deep to adipose tissue with a #15 scalpel blade. The skin margins were undermined to an appropriate distance in all directions utilizing iris scissors. Following this, the designed flap was carried over into the primary defect and sutured into place.

## 2023-11-27 ENCOUNTER — HOSPITAL ENCOUNTER (EMERGENCY)
Age: 78
Discharge: HOME | End: 2023-11-27
Attending: EMERGENCY MEDICINE
Payer: MEDICARE

## 2023-11-27 ENCOUNTER — APPOINTMENT (OUTPATIENT)
Dept: GENERAL RADIOLOGY | Age: 78
End: 2023-11-27
Payer: MEDICARE

## 2023-11-27 ENCOUNTER — APPOINTMENT (OUTPATIENT)
Dept: CT IMAGING | Age: 78
End: 2023-11-27
Payer: MEDICARE

## 2023-11-27 VITALS
WEIGHT: 171.74 LBS | OXYGEN SATURATION: 97 % | DIASTOLIC BLOOD PRESSURE: 102 MMHG | HEIGHT: 61 IN | HEART RATE: 90 BPM | BODY MASS INDEX: 32.42 KG/M2 | SYSTOLIC BLOOD PRESSURE: 178 MMHG | TEMPERATURE: 98.3 F | RESPIRATION RATE: 15 BRPM

## 2023-11-27 DIAGNOSIS — R19.7 NAUSEA VOMITING AND DIARRHEA: ICD-10-CM

## 2023-11-27 DIAGNOSIS — R42 LIGHTHEADEDNESS: Primary | ICD-10-CM

## 2023-11-27 DIAGNOSIS — R11.2 NAUSEA VOMITING AND DIARRHEA: ICD-10-CM

## 2023-11-27 DIAGNOSIS — N39.0 URINARY TRACT INFECTION WITHOUT HEMATURIA, SITE UNSPECIFIED: ICD-10-CM

## 2023-11-27 LAB
ALBUMIN SERPL-MCNC: 4.2 G/DL (ref 3.4–5)
ALBUMIN/GLOB SERPL: 1.2 {RATIO} (ref 1.1–2.2)
ALP SERPL-CCNC: 128 U/L (ref 40–129)
ALT SERPL-CCNC: 17 U/L (ref 10–40)
ANION GAP SERPL CALCULATED.3IONS-SCNC: 10 MMOL/L (ref 3–16)
AST SERPL-CCNC: 20 U/L (ref 15–37)
BACTERIA URNS QL MICRO: ABNORMAL /HPF
BASOPHILS # BLD: 0 K/UL (ref 0–0.2)
BASOPHILS NFR BLD: 0.6 %
BILIRUB SERPL-MCNC: 0.5 MG/DL (ref 0–1)
BILIRUB UR QL STRIP.AUTO: ABNORMAL
BUN SERPL-MCNC: 13 MG/DL (ref 7–20)
CALCIUM OXALATE CRYSTALS: PRESENT
CALCIUM SERPL-MCNC: 9.8 MG/DL (ref 8.3–10.6)
CHARACTER UR: ABNORMAL
CHLORIDE SERPL-SCNC: 101 MMOL/L (ref 99–110)
CHP ED QC CHECK: YES
CLARITY UR: ABNORMAL
CO2 SERPL-SCNC: 28 MMOL/L (ref 21–32)
COLOR UR: ABNORMAL
CREAT SERPL-MCNC: 1.2 MG/DL (ref 0.6–1.2)
DEPRECATED RDW RBC AUTO: 13.5 % (ref 12.4–15.4)
EKG ATRIAL RATE: 82 BPM
EKG DIAGNOSIS: NORMAL
EKG P AXIS: 38 DEGREES
EKG P-R INTERVAL: 150 MS
EKG Q-T INTERVAL: 370 MS
EKG QRS DURATION: 130 MS
EKG QTC CALCULATION (BAZETT): 432 MS
EKG R AXIS: 53 DEGREES
EKG T AXIS: 18 DEGREES
EKG VENTRICULAR RATE: 82 BPM
EOSINOPHIL # BLD: 0 K/UL (ref 0–0.6)
EOSINOPHIL NFR BLD: 0.5 %
EPI CELLS #/AREA URNS AUTO: 1 /HPF (ref 0–5)
FLUAV RNA UPPER RESP QL NAA+PROBE: NEGATIVE
FLUBV AG NPH QL: NEGATIVE
GFR SERPLBLD CREATININE-BSD FMLA CKD-EPI: 47 ML/MIN/{1.73_M2}
GLUCOSE BLD-MCNC: 87 MG/DL
GLUCOSE BLD-MCNC: 87 MG/DL (ref 70–99)
GLUCOSE SERPL-MCNC: 88 MG/DL (ref 70–99)
GLUCOSE UR STRIP.AUTO-MCNC: NEGATIVE MG/DL
HCT VFR BLD AUTO: 41.3 % (ref 36–48)
HGB BLD-MCNC: 13.9 G/DL (ref 12–16)
HGB UR QL STRIP.AUTO: ABNORMAL
HYALINE CASTS #/AREA URNS LPF: ABNORMAL /LPF (ref 0–2)
INR PPP: 1.07 (ref 0.84–1.16)
KETONES UR STRIP.AUTO-MCNC: 15 MG/DL
LEUKOCYTE ESTERASE UR QL STRIP.AUTO: ABNORMAL
LYMPHOCYTES # BLD: 2.1 K/UL (ref 1–5.1)
LYMPHOCYTES NFR BLD: 27.5 %
MAGNESIUM SERPL-MCNC: 2.4 MG/DL (ref 1.8–2.4)
MCH RBC QN AUTO: 28.7 PG (ref 26–34)
MCHC RBC AUTO-ENTMCNC: 33.6 G/DL (ref 31–36)
MCV RBC AUTO: 85.3 FL (ref 80–100)
MONOCYTES # BLD: 0.5 K/UL (ref 0–1.3)
MONOCYTES NFR BLD: 7 %
MUCUS: PRESENT
NEUTROPHILS # BLD: 4.9 K/UL (ref 1.7–7.7)
NEUTROPHILS NFR BLD: 64.4 %
NITRITE UR QL STRIP.AUTO: NEGATIVE
PERFORMED ON: NORMAL
PH UR STRIP.AUTO: 5.5 [PH] (ref 5–8)
PLATELET # BLD AUTO: 199 K/UL (ref 135–450)
PMV BLD AUTO: 8.7 FL (ref 5–10.5)
POTASSIUM SERPL-SCNC: 3.5 MMOL/L (ref 3.5–5.1)
PROT SERPL-MCNC: 7.8 G/DL (ref 6.4–8.2)
PROT UR STRIP.AUTO-MCNC: 30 MG/DL
PROTHROMBIN TIME: 14 SEC (ref 11.5–14.8)
RBC # BLD AUTO: 4.84 M/UL (ref 4–5.2)
RBC #/AREA URNS HPF: ABNORMAL /HPF (ref 0–4)
SARS-COV-2 RDRP RESP QL NAA+PROBE: NOT DETECTED
SODIUM SERPL-SCNC: 139 MMOL/L (ref 136–145)
SP GR UR STRIP.AUTO: 1.02 (ref 1–1.03)
TROPONIN, HIGH SENSITIVITY: 13 NG/L (ref 0–14)
UA COMPLETE W REFLEX CULTURE PNL UR: ABNORMAL
UA DIPSTICK W REFLEX MICRO PNL UR: YES
URN SPEC COLLECT METH UR: ABNORMAL
UROBILINOGEN UR STRIP-ACNC: 1 E.U./DL
WBC # BLD AUTO: 7.5 K/UL (ref 4–11)
WBC #/AREA URNS HPF: ABNORMAL /HPF (ref 0–5)

## 2023-11-27 PROCEDURE — 83735 ASSAY OF MAGNESIUM: CPT

## 2023-11-27 PROCEDURE — 84484 ASSAY OF TROPONIN QUANT: CPT

## 2023-11-27 PROCEDURE — 6370000000 HC RX 637 (ALT 250 FOR IP): Performed by: EMERGENCY MEDICINE

## 2023-11-27 PROCEDURE — 81001 URINALYSIS AUTO W/SCOPE: CPT

## 2023-11-27 PROCEDURE — 87635 SARS-COV-2 COVID-19 AMP PRB: CPT

## 2023-11-27 PROCEDURE — 85610 PROTHROMBIN TIME: CPT

## 2023-11-27 PROCEDURE — 85025 COMPLETE CBC W/AUTO DIFF WBC: CPT

## 2023-11-27 PROCEDURE — 87804 INFLUENZA ASSAY W/OPTIC: CPT

## 2023-11-27 PROCEDURE — 99285 EMERGENCY DEPT VISIT HI MDM: CPT

## 2023-11-27 PROCEDURE — 93005 ELECTROCARDIOGRAM TRACING: CPT | Performed by: EMERGENCY MEDICINE

## 2023-11-27 PROCEDURE — 93010 ELECTROCARDIOGRAM REPORT: CPT | Performed by: INTERNAL MEDICINE

## 2023-11-27 PROCEDURE — 70498 CT ANGIOGRAPHY NECK: CPT

## 2023-11-27 PROCEDURE — 71045 X-RAY EXAM CHEST 1 VIEW: CPT

## 2023-11-27 PROCEDURE — 80053 COMPREHEN METABOLIC PANEL: CPT

## 2023-11-27 PROCEDURE — 6360000004 HC RX CONTRAST MEDICATION: Performed by: EMERGENCY MEDICINE

## 2023-11-27 PROCEDURE — 70450 CT HEAD/BRAIN W/O DYE: CPT

## 2023-11-27 RX ORDER — CEFUROXIME AXETIL 250 MG/1
500 TABLET ORAL ONCE
Status: COMPLETED | OUTPATIENT
Start: 2023-11-27 | End: 2023-11-27

## 2023-11-27 RX ORDER — CEFUROXIME AXETIL 250 MG/1
250 TABLET ORAL 2 TIMES DAILY
Qty: 14 TABLET | Refills: 0 | Status: SHIPPED | OUTPATIENT
Start: 2023-11-27 | End: 2023-12-04

## 2023-11-27 RX ADMIN — IOPAMIDOL 75 ML: 755 INJECTION, SOLUTION INTRAVENOUS at 12:56

## 2023-11-27 RX ADMIN — CEFUROXIME AXETIL 500 MG: 250 TABLET ORAL at 16:08

## 2023-11-27 ASSESSMENT — PAIN - FUNCTIONAL ASSESSMENT: PAIN_FUNCTIONAL_ASSESSMENT: NONE - DENIES PAIN

## 2023-11-27 NOTE — ED NOTES
Lunch break given to [facility] [place]; all questions and concerns answered;     Johnathan Monae RN  11/27/23 7452

## 2023-11-27 NOTE — DISCHARGE INSTRUCTIONS
Drink plenty of fluids. Follow-up with a primary care physician in 1 to 2 days for reexamination. Follow a clear liquid diet for 24 hours. Advance to a bland diet as tolerated. If condition worsens or new symptoms develop, return immediately to the emergency department. Use caution when walking or changing positions or when standing up from a seated or lying down position.

## 2023-11-27 NOTE — ED NOTES
Discharge and education instructions reviewed with patient. Teach-back successful. Pt verbalized understanding and denied questions at this time. No acute distress noted. Patient instructed to follow-up as noted - return to emergency department if symptoms worsen. Patient verbalized understanding. Discharged per EDMD with discharge instructions. Pt discharged to private vehicle. Patient stable upon departure.      Larisa Jaramillo RN  11/27/23 5646

## 2023-11-27 NOTE — ED NOTES
Pt arrived from [address] [facility] via stretcher. Pt placed in gown and on cardiac monitor. Pt's  and sister at bedside. Call light within reach.      Anuja Kay RN  11/27/23 1049
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
## 2023-11-27 NOTE — ED NOTES
Pt walked to restroom and back to room without incident/pt placed back on monitor and call light within reach. Will continue to monitor, record and support patients needs.       Colletta Grooms, RN  11/27/23 0317

## 2023-12-07 ENCOUNTER — OFFICE VISIT (OUTPATIENT)
Dept: PRIMARY CARE CLINIC | Age: 78
End: 2023-12-07
Payer: MEDICARE

## 2023-12-07 VITALS
WEIGHT: 161.2 LBS | BODY MASS INDEX: 30.43 KG/M2 | TEMPERATURE: 98.2 F | OXYGEN SATURATION: 98 % | HEART RATE: 94 BPM | HEIGHT: 61 IN | DIASTOLIC BLOOD PRESSURE: 100 MMHG | SYSTOLIC BLOOD PRESSURE: 170 MMHG

## 2023-12-07 DIAGNOSIS — I10 HYPERTENSION, ESSENTIAL: Primary | ICD-10-CM

## 2023-12-07 DIAGNOSIS — Z12.31 ENCOUNTER FOR SCREENING MAMMOGRAM FOR HIGH-RISK PATIENT: ICD-10-CM

## 2023-12-07 DIAGNOSIS — R63.8 WEIGHT DISORDER: ICD-10-CM

## 2023-12-07 DIAGNOSIS — R00.0 TACHYCARDIA: ICD-10-CM

## 2023-12-07 DIAGNOSIS — Z71.85 VACCINE COUNSELING: ICD-10-CM

## 2023-12-07 PROCEDURE — 3077F SYST BP >= 140 MM HG: CPT | Performed by: INTERNAL MEDICINE

## 2023-12-07 PROCEDURE — 99204 OFFICE O/P NEW MOD 45 MIN: CPT | Performed by: INTERNAL MEDICINE

## 2023-12-07 PROCEDURE — 1123F ACP DISCUSS/DSCN MKR DOCD: CPT | Performed by: INTERNAL MEDICINE

## 2023-12-07 PROCEDURE — 3080F DIAST BP >= 90 MM HG: CPT | Performed by: INTERNAL MEDICINE

## 2023-12-07 RX ORDER — AMLODIPINE BESYLATE 5 MG/1
5 TABLET ORAL DAILY
Qty: 30 TABLET | Refills: 3 | Status: SHIPPED | OUTPATIENT
Start: 2023-12-07

## 2023-12-07 SDOH — ECONOMIC STABILITY: FOOD INSECURITY: WITHIN THE PAST 12 MONTHS, YOU WORRIED THAT YOUR FOOD WOULD RUN OUT BEFORE YOU GOT MONEY TO BUY MORE.: NEVER TRUE

## 2023-12-07 SDOH — ECONOMIC STABILITY: FOOD INSECURITY: WITHIN THE PAST 12 MONTHS, THE FOOD YOU BOUGHT JUST DIDN'T LAST AND YOU DIDN'T HAVE MONEY TO GET MORE.: NEVER TRUE

## 2023-12-07 SDOH — ECONOMIC STABILITY: INCOME INSECURITY: HOW HARD IS IT FOR YOU TO PAY FOR THE VERY BASICS LIKE FOOD, HOUSING, MEDICAL CARE, AND HEATING?: SOMEWHAT HARD

## 2023-12-07 SDOH — ECONOMIC STABILITY: HOUSING INSECURITY
IN THE LAST 12 MONTHS, WAS THERE A TIME WHEN YOU DID NOT HAVE A STEADY PLACE TO SLEEP OR SLEPT IN A SHELTER (INCLUDING NOW)?: NO

## 2023-12-07 ASSESSMENT — PATIENT HEALTH QUESTIONNAIRE - PHQ9
SUM OF ALL RESPONSES TO PHQ QUESTIONS 1-9: 0
2. FEELING DOWN, DEPRESSED OR HOPELESS: 0
SUM OF ALL RESPONSES TO PHQ QUESTIONS 1-9: 0
1. LITTLE INTEREST OR PLEASURE IN DOING THINGS: 0
SUM OF ALL RESPONSES TO PHQ9 QUESTIONS 1 & 2: 0

## 2023-12-07 ASSESSMENT — ENCOUNTER SYMPTOMS
BLOOD IN STOOL: 0
CHEST TIGHTNESS: 0
ABDOMINAL PAIN: 0
ABDOMINAL DISTENTION: 0
COUGH: 0
DIARRHEA: 0
CONSTIPATION: 0
WHEEZING: 0
SHORTNESS OF BREATH: 0
GASTROINTESTINAL NEGATIVE: 1

## 2024-01-24 ENCOUNTER — TELEPHONE (OUTPATIENT)
Dept: PRIMARY CARE CLINIC | Age: 79
End: 2024-01-24

## 2024-01-24 NOTE — TELEPHONE ENCOUNTER
Patient called request a med change. States that the amlodipine makes her dizzy and makes her legs feel heavy.      Send to   [facility] [id number] [facility] [address] - P [phone] - F [phone]

## 2024-01-25 ENCOUNTER — TELEPHONE (OUTPATIENT)
Dept: PRIMARY CARE CLINIC | Age: 79
End: 2024-01-25

## 2024-03-07 ENCOUNTER — OFFICE VISIT (OUTPATIENT)
Dept: PRIMARY CARE CLINIC | Age: 79
End: 2024-03-07
Payer: MEDICARE

## 2024-03-07 VITALS
OXYGEN SATURATION: 99 % | SYSTOLIC BLOOD PRESSURE: 146 MMHG | TEMPERATURE: 97.3 F | RESPIRATION RATE: 16 BRPM | HEART RATE: 86 BPM | WEIGHT: 160 LBS | HEIGHT: 61 IN | DIASTOLIC BLOOD PRESSURE: 88 MMHG | BODY MASS INDEX: 30.21 KG/M2

## 2024-03-07 DIAGNOSIS — Z23 NEED FOR DIPHTHERIA-TETANUS-PERTUSSIS (TDAP) VACCINE: ICD-10-CM

## 2024-03-07 DIAGNOSIS — Z29.11 NEED FOR RSV VACCINATION: ICD-10-CM

## 2024-03-07 DIAGNOSIS — Z23 NEED FOR COVID-19 VACCINE: Primary | ICD-10-CM

## 2024-03-07 DIAGNOSIS — I10 HYPERTENSION, ESSENTIAL: ICD-10-CM

## 2024-03-07 DIAGNOSIS — Z23 NEED FOR SHINGLES VACCINE: ICD-10-CM

## 2024-03-07 PROCEDURE — 3079F DIAST BP 80-89 MM HG: CPT | Performed by: INTERNAL MEDICINE

## 2024-03-07 PROCEDURE — 99214 OFFICE O/P EST MOD 30 MIN: CPT | Performed by: INTERNAL MEDICINE

## 2024-03-07 PROCEDURE — 1123F ACP DISCUSS/DSCN MKR DOCD: CPT | Performed by: INTERNAL MEDICINE

## 2024-03-07 PROCEDURE — 3077F SYST BP >= 140 MM HG: CPT | Performed by: INTERNAL MEDICINE

## 2024-03-07 RX ORDER — HYDROCHLOROTHIAZIDE 25 MG/1
25 TABLET ORAL EVERY MORNING
Qty: 30 TABLET | Refills: 5 | Status: SHIPPED | OUTPATIENT
Start: 2024-03-07

## 2024-03-07 ASSESSMENT — ENCOUNTER SYMPTOMS
GASTROINTESTINAL NEGATIVE: 1
CHEST TIGHTNESS: 0
ABDOMINAL PAIN: 0
WHEEZING: 0
BLOOD IN STOOL: 0
COUGH: 0
SHORTNESS OF BREATH: 0
ABDOMINAL DISTENTION: 0
CONSTIPATION: 0
DIARRHEA: 0

## 2024-03-07 ASSESSMENT — PATIENT HEALTH QUESTIONNAIRE - PHQ9
SUM OF ALL RESPONSES TO PHQ9 QUESTIONS 1 & 2: 0
SUM OF ALL RESPONSES TO PHQ QUESTIONS 1-9: 0
1. LITTLE INTEREST OR PLEASURE IN DOING THINGS: 0
2. FEELING DOWN, DEPRESSED OR HOPELESS: 0
SUM OF ALL RESPONSES TO PHQ QUESTIONS 1-9: 0
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
## 2024-06-05 ENCOUNTER — TELEPHONE (OUTPATIENT)
Dept: PRIMARY CARE CLINIC | Age: 79
End: 2024-06-05

## 2024-06-05 ENCOUNTER — APPOINTMENT (OUTPATIENT)
Dept: GENERAL RADIOLOGY | Age: 79
End: 2024-06-05
Payer: MEDICARE

## 2024-06-05 ENCOUNTER — HOSPITAL ENCOUNTER (EMERGENCY)
Age: 79
Discharge: HOME | End: 2024-06-05
Payer: MEDICARE

## 2024-06-05 VITALS
OXYGEN SATURATION: 97 % | HEART RATE: 82 BPM | SYSTOLIC BLOOD PRESSURE: 138 MMHG | WEIGHT: 159.61 LBS | DIASTOLIC BLOOD PRESSURE: 84 MMHG | RESPIRATION RATE: 16 BRPM | TEMPERATURE: 97.5 F | BODY MASS INDEX: 30.16 KG/M2

## 2024-06-05 DIAGNOSIS — I10 PRIMARY HYPERTENSION: ICD-10-CM

## 2024-06-05 DIAGNOSIS — S46.911A STRAIN OF RIGHT SHOULDER, INITIAL ENCOUNTER: Primary | ICD-10-CM

## 2024-06-05 PROCEDURE — 73030 X-RAY EXAM OF SHOULDER: CPT

## 2024-06-05 PROCEDURE — 99283 EMERGENCY DEPT VISIT LOW MDM: CPT

## 2024-06-05 RX ORDER — NAPROXEN 375 MG/1
375 TABLET ORAL 2 TIMES DAILY WITH MEALS
Qty: 10 TABLET | Refills: 0 | Status: SHIPPED | OUTPATIENT
Start: 2024-06-05 | End: 2024-06-10

## 2024-06-05 ASSESSMENT — LIFESTYLE VARIABLES
HOW OFTEN DO YOU HAVE A DRINK CONTAINING ALCOHOL: NEVER
HOW MANY STANDARD DRINKS CONTAINING ALCOHOL DO YOU HAVE ON A TYPICAL DAY: PATIENT DOES NOT DRINK

## 2024-06-05 ASSESSMENT — PAIN DESCRIPTION - DESCRIPTORS: DESCRIPTORS: JABBING

## 2024-06-05 ASSESSMENT — PAIN SCALES - GENERAL: PAINLEVEL_OUTOF10: 3

## 2024-06-05 ASSESSMENT — PAIN DESCRIPTION - LOCATION: LOCATION: SHOULDER

## 2024-06-05 ASSESSMENT — PAIN DESCRIPTION - ORIENTATION: ORIENTATION: RIGHT

## 2024-06-05 NOTE — TELEPHONE ENCOUNTER
Called number provided no answer and voicemail is full. Called patient wasn't not sure what the call was about. Pt nephew will call to schedule ED follow up.

## 2024-06-05 NOTE — TELEPHONE ENCOUNTER
----- Message from Corrie Saxena sent at 6/5/2024  9:25 AM EDT -----  Regarding: ECC Message to Provider  ECC Message to Provider    Relationship to Patient: Third Party     Additional Information Caller want the patient papers sent on the emergency room.  --------------------------------------------------------------------------------------------------------------------------    Call Back Information: OK to leave message on voicemail  Preferred Call Back Number: Phone [phone]

## 2024-06-05 NOTE — ED TRIAGE NOTES
Fell one month ago and shoulder has been sore since. 2 days ago started having a jabbing pain. [place] like her BP was high this AM.

## 2024-06-05 NOTE — ED PROVIDER NOTES
[facility] EMERGENCY DEPARTMENT  EMERGENCY DEPARTMENT ENCOUNTER        Pt Name: Trevor Ocampo  MRN: [id number]  Birthdate 1945  Date of evaluation: 6/5/2024  Provider: Addison Tamez PA-C  PCP: Chris Becerril MD  Note Started: 11:14 AM EDT 6/5/24      [place]. I have evaluated this patient.        CHIEF COMPLAINT       Chief Complaint   Patient presents with    Shoulder Pain     Fell one month ago and shoulder has been sore since. 2 days ago started having a jabbing pain. [place] like her BP was high this AM.    Hypertension       HISTORY OF PRESENT ILLNESS: 1 or more Elements     History From: Patient    Trevor Ocampo is a 78 y.o. female who presents to the emergency department with complaint injury dominant right shoulder.  About a month ago outside doing yard work tripped fell landing on the right shoulder and arm.  It folded against her.  She had some minor discomfort but the discomfort has progressed.  It is now worsened over the past several days with limited range of motion.  The patient demonstrates inability for external rotation as well as abduction.  I was able to hold her arm up to help her and then as I released she dropped the arm due to discomfort in the right shoulder.  She has not seen healthcare provider for this complaint nor she had imaging since the fall injury occurring about a month plus ago.    Patient also incidentally indicated blood pressure bit elevated 150/89.  She has known hypertension and takes medication.  No chest pain or shortness of breath.  She has negative cardiovascular complaints at this time.    Nursing Notes were all reviewed and agreed with or any disagreements were addressed in the HPI.    REVIEW OF SYSTEMS :      Review of Systems    Positives and Pertinent negatives as per HPI.     SURGICAL HISTORY     Past Surgical History:   Procedure Laterality Date    BACK SURGERY      COLONOSCOPY N/A 3/17/2022    COLONOSCOPY performed by Sonu RANKIN

## 2024-06-05 NOTE — ED NOTES
Provider order placed for patient's discharge. Provider reviewed decision to discharge with the patient. Discharge paperwork and any prescriptions were reviewed with the patient. Patient verbalized understanding of discharge education and any prescriptions and has no further questions or further needs at this time. Patient left with all personal belongings and was stable upon departure. Patient thanked for choosing [facility] and informed to return should any need arise.

## 2024-06-05 NOTE — DISCHARGE INSTRUCTIONS
Take your blood pressure medication as prescribed.  Blood pressure a bit elevated in the ED.  Not concerning.  X-ray of the shoulder was negative.  Limited range of motion noted on my exam.  I do understand fall about a month ago.  Strongly recommend follow-up with orthopedics for further evaluation.  Physical therapy may be needed to help restore full range of motion of the shoulder.  In addition to the naproxen twice a day with meals for 5 days I would like you to take Tylenol 1000 mg 3 times a day.

## 2024-06-06 ENCOUNTER — TELEPHONE (OUTPATIENT)
Dept: ORTHOPEDIC SURGERY | Age: 79
End: 2024-06-06

## 2024-07-16 ENCOUNTER — TELEPHONE (OUTPATIENT)
Dept: PRIMARY CARE CLINIC | Age: 79
End: 2024-07-16

## (undated) DEVICE — ENDOSCOPY KIT: Brand: MEDLINE INDUSTRIES, INC.

## (undated) DEVICE — SUTURE PDS II SZ 0 L60IN ABSRB VLT L48MM CTX 1/2 CIR Z990G

## (undated) DEVICE — SUTURE PERMAHAND SZ 0 L30IN NONABSORBABLE BLK L26MM SH 1/2 K834H

## (undated) DEVICE — ELECTRO LUBE IS A SINGLE PATIENT USE DEVICE THAT IS INTENDED TO BE USED ON ELECTROSURGICAL ELECTRODES TO REDUCE STICKING.: Brand: KEY SURGICAL ELECTRO LUBE

## (undated) DEVICE — SYRINGE MED 10ML POLYPR LUERSLIP TIP FLAT TOP W/O SFTY DISP

## (undated) DEVICE — SURE SET-DOUBLE BASIN-LF: Brand: MEDLINE INDUSTRIES, INC.

## (undated) DEVICE — STANDARD HYPODERMIC NEEDLE,POLYPROPYLENE HUB: Brand: MONOJECT

## (undated) DEVICE — TROCAR: Brand: KII OPTICAL ACCESS SYSTEM

## (undated) DEVICE — TISSUE RETRIEVAL SYSTEM: Brand: INZII RETRIEVAL SYSTEM

## (undated) DEVICE — [HIGH FLOW INSUFFLATOR,  DO NOT USE IF PACKAGE IS DAMAGED,  KEEP DRY,  KEEP AWAY FROM SUNLIGHT,  PROTECT FROM HEAT AND RADIOACTIVE SOURCES.]: Brand: PNEUMOSURE

## (undated) DEVICE — MARYLAND BIPOLAR FORCEPS: Brand: ENDOWRIST

## (undated) DEVICE — GLOVE SURG SZ 7 L12IN FNGR THK75MIL WHT LTX POLYMER BEAD

## (undated) DEVICE — SURGICAL SET UP - SURE SET: Brand: MEDLINE INDUSTRIES, INC.

## (undated) DEVICE — DRAPE,LAP,CHOLE,W/TROUGHS,STERILE: Brand: MEDLINE

## (undated) DEVICE — SUTURE MCRYL SZ 4-0 L27IN ABSRB UD L19MM PS-2 1/2 CIR PRIM Y426H

## (undated) DEVICE — TIP COVER ACCESSORY

## (undated) DEVICE — SCISSORS SURG DIA8MM MPLR CRV ENDOWRIST

## (undated) DEVICE — SYSTEM SMK EVAC LAP TBNG FILTER HSNG BENT STYL PNK SEE CLR

## (undated) DEVICE — LARGE NEEDLE DRIVER: Brand: ENDOWRIST

## (undated) DEVICE — Z DUP USE 2641840 CLIP INT L POLYMER LOK LIG HEM O LOK

## (undated) DEVICE — Device

## (undated) DEVICE — BLADELESS OBTURATOR: Brand: WECK VISTA

## (undated) DEVICE — SEAL

## (undated) DEVICE — BLANKET WRM W29.9XL79.1IN UP BODY FORC AIR MISTRAL-AIR

## (undated) DEVICE — CANNULA SEAL

## (undated) DEVICE — SUTURE PERMA-HAND SZ 2-0 L30IN NONABSORBABLE BLK L26MM SH K833H

## (undated) DEVICE — JEWISH HOSPITAL TURNOVER KIT: Brand: MEDLINE INDUSTRIES, INC.

## (undated) DEVICE — LARGE HEM-O-LOK CLIP APPLIER: Brand: ENDOWRIST

## (undated) DEVICE — FENESTRATED BIPOLAR FORCEPS: Brand: ENDOWRIST

## (undated) DEVICE — APPLICATOR MEDICATED 26 CC SOLUTION HI LT ORNG CHLORAPREP

## (undated) DEVICE — 3M™ IOBAN™ 2 ANTIMICROBIAL INCISE DRAPE 6648EZ: Brand: IOBAN™ 2

## (undated) DEVICE — 40583 XL ADVANCED TRENDELENBURG POSITIONING KIT: Brand: 40583 XL ADVANCED TRENDELENBURG POSITIONING KIT

## (undated) DEVICE — SUTURE PERMA-HAND SZ 3-0 L30IN NONABSORBABLE BLK L17MM RB-1 K872H

## (undated) DEVICE — TRAY CATHETER 16FR F INCLUDE BARDX IC COMPLT CARE DRNGE BG

## (undated) DEVICE — TOWEL,OR,DSP,ST,BLUE,DLX,8/PK,10PK/CS: Brand: MEDLINE

## (undated) DEVICE — ARM DRAPE

## (undated) DEVICE — SUTURE NONABSORBABLE MONOFILAMENT 4-0 RB-1 36 IN BLU PROLENE 8557H

## (undated) DEVICE — ADHESIVE SKIN CLSR 0.7ML TOP DERMBND ADV

## (undated) DEVICE — ELECTROSURGICAL PENCIL ROCKER SWITCH NON COATED BLADE ELECTRODE 10 FT (3 M) CORD HOLSTER: Brand: MEGADYNE

## (undated) DEVICE — COLUMN DRAPE

## (undated) DEVICE — TROCAR: Brand: KII FIOS FIRST ENTRY

## (undated) DEVICE — SOLUTION ANTIFOG VIS SYS CLEARIFY LAPSCP

## (undated) DEVICE — REDUCER: Brand: ENDOWRIST

## (undated) DEVICE — PUMP SUC IRR TBNG L10FT W/ HNDPC ASSEMB STRYKEFLOW 2

## (undated) DEVICE — SOLUTION INJ LR VISIV 1000ML BG

## (undated) DEVICE — STERILE POLYISOPRENE POWDER-FREE SURGICAL GLOVES WITH EMOLLIENT COATING: Brand: PROTEXIS

## (undated) DEVICE — GARMENT,MEDLINE,DVT,INT,CALF,MED, GEN2: Brand: MEDLINE

## (undated) DEVICE — PLATE ES AD W 9FT CRD 2

## (undated) DEVICE — SUTURE VCRL SZ 0 L54IN ABSRB VLT W/O NDL POLYGLACTIN 910 J616H

## (undated) DEVICE — CADIERE FORCEPS: Brand: ENDOWRIST

## (undated) DEVICE — SPONGE,LAP,4"X18",XR,ST,5/PK,40PK/CS: Brand: MEDLINE INDUSTRIES, INC.